# Patient Record
Sex: FEMALE | Race: OTHER | NOT HISPANIC OR LATINO
[De-identification: names, ages, dates, MRNs, and addresses within clinical notes are randomized per-mention and may not be internally consistent; named-entity substitution may affect disease eponyms.]

---

## 2019-09-06 ENCOUNTER — TRANSCRIPTION ENCOUNTER (OUTPATIENT)
Age: 84
End: 2019-09-06

## 2019-09-07 ENCOUNTER — INPATIENT (INPATIENT)
Facility: HOSPITAL | Age: 84
LOS: 3 days | Discharge: NURSING, REHAB OR LONG-TERM CARE | DRG: 481 | End: 2019-09-11
Attending: ORTHOPAEDIC SURGERY | Admitting: ORTHOPAEDIC SURGERY
Payer: MEDICARE

## 2019-09-07 ENCOUNTER — TRANSCRIPTION ENCOUNTER (OUTPATIENT)
Age: 84
End: 2019-09-07

## 2019-09-07 VITALS
TEMPERATURE: 98 F | HEIGHT: 64 IN | DIASTOLIC BLOOD PRESSURE: 88 MMHG | HEART RATE: 72 BPM | SYSTOLIC BLOOD PRESSURE: 135 MMHG | RESPIRATION RATE: 18 BRPM | WEIGHT: 130.07 LBS | OXYGEN SATURATION: 98 %

## 2019-09-07 DIAGNOSIS — S72.044A NONDISPLACED FRACTURE OF BASE OF NECK OF RIGHT FEMUR, INITIAL ENCOUNTER FOR CLOSED FRACTURE: ICD-10-CM

## 2019-09-07 DIAGNOSIS — Z95.2 PRESENCE OF PROSTHETIC HEART VALVE: Chronic | ICD-10-CM

## 2019-09-07 LAB
ANION GAP SERPL CALC-SCNC: 15 MMOL/L — SIGNIFICANT CHANGE UP (ref 5–17)
APPEARANCE UR: CLEAR — SIGNIFICANT CHANGE UP
APTT BLD: 33.3 SEC — SIGNIFICANT CHANGE UP (ref 27.5–36.3)
BILIRUB UR-MCNC: NEGATIVE — SIGNIFICANT CHANGE UP
BLD GP AB SCN SERPL QL: NEGATIVE — SIGNIFICANT CHANGE UP
BUN SERPL-MCNC: 20 MG/DL — SIGNIFICANT CHANGE UP (ref 7–23)
CALCIUM SERPL-MCNC: 9.1 MG/DL — SIGNIFICANT CHANGE UP (ref 8.4–10.5)
CHLORIDE SERPL-SCNC: 99 MMOL/L — SIGNIFICANT CHANGE UP (ref 96–108)
CO2 SERPL-SCNC: 27 MMOL/L — SIGNIFICANT CHANGE UP (ref 22–31)
COLOR SPEC: SIGNIFICANT CHANGE UP
CREAT SERPL-MCNC: 0.83 MG/DL — SIGNIFICANT CHANGE UP (ref 0.5–1.3)
DIFF PNL FLD: ABNORMAL
GLUCOSE SERPL-MCNC: 151 MG/DL — HIGH (ref 70–99)
GLUCOSE UR QL: NEGATIVE — SIGNIFICANT CHANGE UP
HCT VFR BLD CALC: 36.9 % — SIGNIFICANT CHANGE UP (ref 34.5–45)
HGB BLD-MCNC: 12 G/DL — SIGNIFICANT CHANGE UP (ref 11.5–15.5)
INR BLD: 1.2 RATIO — HIGH (ref 0.88–1.16)
KETONES UR-MCNC: NEGATIVE — SIGNIFICANT CHANGE UP
LEUKOCYTE ESTERASE UR-ACNC: ABNORMAL
MCHC RBC-ENTMCNC: 29.6 PG — SIGNIFICANT CHANGE UP (ref 27–34)
MCHC RBC-ENTMCNC: 32.4 GM/DL — SIGNIFICANT CHANGE UP (ref 32–36)
MCV RBC AUTO: 91.5 FL — SIGNIFICANT CHANGE UP (ref 80–100)
NITRITE UR-MCNC: NEGATIVE — SIGNIFICANT CHANGE UP
PH UR: 6.5 — SIGNIFICANT CHANGE UP (ref 5–8)
PLATELET # BLD AUTO: 273 K/UL — SIGNIFICANT CHANGE UP (ref 150–400)
POTASSIUM SERPL-MCNC: 3.7 MMOL/L — SIGNIFICANT CHANGE UP (ref 3.5–5.3)
POTASSIUM SERPL-SCNC: 3.7 MMOL/L — SIGNIFICANT CHANGE UP (ref 3.5–5.3)
PROT UR-MCNC: SIGNIFICANT CHANGE UP
PROTHROM AB SERPL-ACNC: 13.7 SEC — HIGH (ref 10–12.9)
RBC # BLD: 4.03 M/UL — SIGNIFICANT CHANGE UP (ref 3.8–5.2)
RBC # FLD: 12.4 % — SIGNIFICANT CHANGE UP (ref 10.3–14.5)
RH IG SCN BLD-IMP: POSITIVE — SIGNIFICANT CHANGE UP
SODIUM SERPL-SCNC: 141 MMOL/L — SIGNIFICANT CHANGE UP (ref 135–145)
SP GR SPEC: 1.02 — SIGNIFICANT CHANGE UP (ref 1.01–1.02)
UROBILINOGEN FLD QL: NEGATIVE — SIGNIFICANT CHANGE UP
WBC # BLD: 5.5 K/UL — SIGNIFICANT CHANGE UP (ref 3.8–10.5)
WBC # FLD AUTO: 5.5 K/UL — SIGNIFICANT CHANGE UP (ref 3.8–10.5)

## 2019-09-07 PROCEDURE — 72192 CT PELVIS W/O DYE: CPT | Mod: 26

## 2019-09-07 PROCEDURE — 73502 X-RAY EXAM HIP UNI 2-3 VIEWS: CPT | Mod: 26,RT

## 2019-09-07 PROCEDURE — 93010 ELECTROCARDIOGRAM REPORT: CPT

## 2019-09-07 PROCEDURE — 73552 X-RAY EXAM OF FEMUR 2/>: CPT | Mod: 26,RT

## 2019-09-07 PROCEDURE — 99285 EMERGENCY DEPT VISIT HI MDM: CPT

## 2019-09-07 PROCEDURE — 76377 3D RENDER W/INTRP POSTPROCES: CPT | Mod: 26

## 2019-09-07 PROCEDURE — 71045 X-RAY EXAM CHEST 1 VIEW: CPT | Mod: 26

## 2019-09-07 RX ORDER — POTASSIUM CHLORIDE 20 MEQ
1 PACKET (EA) ORAL
Qty: 0 | Refills: 0 | DISCHARGE

## 2019-09-07 RX ORDER — ENOXAPARIN SODIUM 100 MG/ML
40 INJECTION SUBCUTANEOUS DAILY
Refills: 0 | Status: DISCONTINUED | OUTPATIENT
Start: 2019-09-07 | End: 2019-09-07

## 2019-09-07 RX ORDER — DRONEDARONE 400 MG/1
400 TABLET, FILM COATED ORAL
Refills: 0 | Status: DISCONTINUED | OUTPATIENT
Start: 2019-09-07 | End: 2019-09-08

## 2019-09-07 RX ORDER — TRAMADOL HYDROCHLORIDE 50 MG/1
25 TABLET ORAL EVERY 4 HOURS
Refills: 0 | Status: DISCONTINUED | OUTPATIENT
Start: 2019-09-07 | End: 2019-09-08

## 2019-09-07 RX ORDER — VERAPAMIL HCL 240 MG
1 CAPSULE, EXTENDED RELEASE PELLETS 24 HR ORAL
Qty: 0 | Refills: 0 | DISCHARGE

## 2019-09-07 RX ORDER — FUROSEMIDE 40 MG
1 TABLET ORAL
Qty: 0 | Refills: 0 | DISCHARGE

## 2019-09-07 RX ORDER — ASPIRIN/CALCIUM CARB/MAGNESIUM 324 MG
1 TABLET ORAL
Qty: 0 | Refills: 0 | DISCHARGE

## 2019-09-07 RX ORDER — ACETAMINOPHEN 500 MG
650 TABLET ORAL EVERY 6 HOURS
Refills: 0 | Status: DISCONTINUED | OUTPATIENT
Start: 2019-09-07 | End: 2019-09-08

## 2019-09-07 RX ORDER — VERAPAMIL HCL 240 MG
120 CAPSULE, EXTENDED RELEASE PELLETS 24 HR ORAL
Refills: 0 | Status: DISCONTINUED | OUTPATIENT
Start: 2019-09-07 | End: 2019-09-08

## 2019-09-07 RX ORDER — ATORVASTATIN CALCIUM 80 MG/1
1 TABLET, FILM COATED ORAL
Qty: 0 | Refills: 0 | DISCHARGE

## 2019-09-07 RX ORDER — SODIUM CHLORIDE 9 MG/ML
1000 INJECTION, SOLUTION INTRAVENOUS
Refills: 0 | Status: DISCONTINUED | OUTPATIENT
Start: 2019-09-07 | End: 2019-09-08

## 2019-09-07 RX ORDER — FUROSEMIDE 40 MG
20 TABLET ORAL DAILY
Refills: 0 | Status: DISCONTINUED | OUTPATIENT
Start: 2019-09-07 | End: 2019-09-08

## 2019-09-07 RX ORDER — TRAMADOL HYDROCHLORIDE 50 MG/1
50 TABLET ORAL EVERY 4 HOURS
Refills: 0 | Status: DISCONTINUED | OUTPATIENT
Start: 2019-09-07 | End: 2019-09-08

## 2019-09-07 RX ORDER — DRONEDARONE 400 MG/1
1 TABLET, FILM COATED ORAL
Qty: 0 | Refills: 0 | DISCHARGE

## 2019-09-07 RX ORDER — ATORVASTATIN CALCIUM 80 MG/1
20 TABLET, FILM COATED ORAL AT BEDTIME
Refills: 0 | Status: DISCONTINUED | OUTPATIENT
Start: 2019-09-07 | End: 2019-09-08

## 2019-09-07 RX ORDER — ASPIRIN/CALCIUM CARB/MAGNESIUM 324 MG
81 TABLET ORAL DAILY
Refills: 0 | Status: DISCONTINUED | OUTPATIENT
Start: 2019-09-07 | End: 2019-09-08

## 2019-09-07 RX ADMIN — Medication 120 MILLIGRAM(S): at 23:43

## 2019-09-07 RX ADMIN — DRONEDARONE 400 MILLIGRAM(S): 400 TABLET, FILM COATED ORAL at 23:42

## 2019-09-07 NOTE — ED CLERICAL - NS ED CLERK NOTE PRE-ARRIVAL INFORMATION; ADDITIONAL PRE-ARRIVAL INFORMATION
CC/Reason For referral: impacted femoral neck fracture  Preferred Consultant(if applicable):   Who admits for you (if needed): Hospitalist  Do you have documents you would like to fax over? yes/ information already faxed to ED  Would you still like to speak to an ED attending? yes

## 2019-09-07 NOTE — H&P ADULT - NSHPPHYSICALEXAM_GEN_ALL_CORE
T(C): 36.8 (09-07-19 @ 14:14)  HR: 83 (09-07-19 @ 14:14)  BP: 123/71 (09-07-19 @ 14:14)  RR: 16 (09-07-19 @ 14:14)  SpO2: 98% (09-07-19 @ 14:14)  Wt(kg): --    PE RLE:  Skin intact; Compartments soft. No ecchymosis/soft tissue swelling.  + TTP around R Hip. No TTP to knee/leg/ankle/foot. ROM limited 2/2 pain. Able to SLR. + Log Roll/Heel Strike. + DP/PT pulses 2+/4. SILT L2-S1. + TA/EHL/FHL/GS.    Secondary Survey: No TTP over bony prominences, SILT, palpable pulses, full/painless range of motion, compartments soft

## 2019-09-07 NOTE — ED PROVIDER NOTE - ATTENDING CONTRIBUTION TO CARE
Attending MD Jaimes: I personally have seen and examined this patient.  Resident note reviewed and agree on plan of care and except where noted.  See below for details.     Seen in FT4, unaccompanied  PMD Dr. America Zuniga ([place])    87F with PMH/PSH including aortic valve surgery 8 yrs ago on Multaq, Verapamil, Furosemide, Atorvastatin presents to the ED s/p fall getting out of car 9/6/19.  Reports after that got up unassisted, gathered her things and went back to her home.  Reports daughter encouraged her to get XRay.  Reports she went to urgent care yesterday in [place] and had XRay.  Today, received a call that something was broken.  Reports told to take Tylenol for pain about 930am.  Reports allergy to amoxicillin (rash).  Denies chest pain, shortness of breath, palpitations. Denies headache. Denies change in vision, double vision, sudden loss of vision. Denies abdominal pain, nausea, vomiting, diarrhea, blood in stools. Denies dysuria, hematuria, change in urinary habits including frequency, urgency. Denies loss of urinary or bowel continence. Reports she does not want to be here.  On exam, NAD, head NCAT, PERRL, FROM at neck, no tenderness to midline palpation, no stepoffs along length of spine, lungs CTAB with good inspiratory effort, +S1S2, no m/r/g, abdomen soft with +BS, NT, ND, no CVAT, +tenderness to palpation at R hip, no overlying skin changes, moving all extremities including RLE, +2 DPs; A/P: 87F with R hip fx on outpatient XR, will review images    TO BE COMPLETED Attending MD Jaimes: I personally have seen and examined this patient.  Resident note reviewed and agree on plan of care and except where noted.  See below for details.     Seen in FT4, unaccompanied  PMD Dr. America Zuniga ([place])    87F with PMH/PSH including aortic valve surgery 8 yrs ago on Multaq, Verapamil, Furosemide, Atorvastatin presents to the ED s/p fall getting out of car 9/6/19.  Reports after that got up unassisted, gathered her things and went back to her home.  Reports daughter encouraged her to get XRay.  Reports she went to urgent care yesterday in [place] and had XRay.  Today, received a call that something was broken.  Reports told to take Tylenol for pain about 930am.  Reports allergy to amoxicillin (rash).  Denies chest pain, shortness of breath, palpitations. Denies headache. Denies change in vision, double vision, sudden loss of vision. Denies abdominal pain, nausea, vomiting, diarrhea, blood in stools. Denies dysuria, hematuria, change in urinary habits including frequency, urgency. Denies loss of urinary or bowel continence. Reports she does not want to be here.  oOn exam, NAD, head NCAT, PERRL, FROM at neck, no tenderness to midline palpation, no stepoffs along length of spine, lungs CTAB with good inspiratory effort, +S1S2, no m/r/g, abdomen soft with +BS, NT, ND, no CVAT, +tenderness to palpation at R hip, no overlying skin changes, moving all extremities including RLE, +2 DPs; A/P: 87F with R hip fx on outpatient XR, will review images    TO BE COMPLETED    Patient asked to change in to a gown.  Refused.  Reports that she has already been examined.  Explained that the request to change in to a gown Attending MD Jaimes: I personally have seen and examined this patient.  Resident note reviewed and agree on plan of care and except where noted.  See below for details.     Seen in FT4, unaccompanied  PMD Dr. America Zuniga ([place])    87F with PMH/PSH including aortic valve surgery 8 yrs ago on Multaq, Verapamil, Furosemide, Atorvastatin presents to the ED s/p fall getting out of car 9/6/19.  Reports after that got up unassisted, gathered her things and went back to her home.  Reports daughter encouraged her to get XRay.  Reports she went to urgent care yesterday in [place] and had XRay.  Today, received a call that something was broken.  Reports told to take Tylenol for pain about 930am.  Reports allergy to amoxicillin (rash).  Denies chest pain, shortness of breath, palpitations. Denies headache. Denies change in vision, double vision, sudden loss of vision. Denies abdominal pain, nausea, vomiting, diarrhea, blood in stools. Denies dysuria, hematuria, change in urinary habits including frequency, urgency. Denies loss of urinary or bowel continence. Reports she does not want to be here.  On exam, NAD, head NCAT, PERRL, FROM at neck, no tenderness to midline palpation, no stepoffs along length of spine, lungs CTAB with good inspiratory effort, +S1S2, no m/r/g, abdomen soft with +BS, NT, ND, no CVAT, +tenderness to palpation at R hip, no overlying skin changes, moving all extremities including RLE, +2 DPs; A/P: 87F with R hip fx on outpatient XR, will review images if available, will call rads, otherwise will repeat XR, pain control, +/- ortho, asked patient to stay in her bed since she has a presumed fracture of the hip.  Patient asked to change in to a gown.  Refused.  Reports that she has already been examined.  Explained that the request to change in to a gown.  Patient began yelling at this MD.  Tried to explain this was in order to provide the best patient care as I cannot visualize the hip with her pants on.  Patient reported I should be able to do my job with her dressed.  Patient eventually agreed to remove pants.

## 2019-09-07 NOTE — CONSULT NOTE ADULT - SUBJECTIVE AND OBJECTIVE BOX
The patient was seen and examined tonight after an accidental fall 09/05/19 with an acute impacted right femoral neck fracture  that requires orthopedic ORIF. Her comorbidities include non metal AVR 2011, arrythmia controlled with multaq and verapamil; osteoporosis,  controlled hypercholesteremia and advanced age. Exam, lab, EKG and CXR are stable. The patient is medically stable, medically optimized and has no medical contraindication to surgery tomorrow as required. Exam time 70 minutes including > than 50 % for bedside discussion and counseling. Comprehensive consultation dictated #[id number] The patient was seen and examined tonight after an accidental fall 19 with an acute impacted right femoral neck fracture  that requires orthopedic ORIF. Her comorbidities include non metal AVR 2011, arrythmia controlled with multaq and verapamil; osteoporosis,  controlled hypercholesteremia and advanced age. Exam, lab, EKG and CXR are stable. The patient is medically stable, medically optimized and has no medical contraindication to surgery tomorrow as required. Exam time 70 minutes including > than 50 % for bedside discussion and counseling. Comprehensive consultation dictated #[id number]      HISTORY OF PRESENT ILLNESS:  This is the first [facility] admission for this 87-year-old female.  She was in her garage in her new apartment building and slipped on the pavement and was able to get up two days ago.  Shortly thereafter, she developed localized pain in her right hip which persisted.  She went to a walk-in urgent care center and was told that her leg was normal because she can still walk.  She persisted and came to the emergency room for evaluation because of right hip pain and x-rays were unremarkable, but CT scan shows a right subcapital femoral neck fracture which is impacted which is what allows her to walk.  The area is not displaced because it is impacted, but it is unstable and surgical stabilization has been advised.  The patient has moderate pain and otherwise has no other regions of trauma or new medical complaints.    MEDICATIONS:  Her medications that she presently takes, one baby aspirin daily, 400 mg Multaq twice a day, 300 mg of verapamil once a day, 20 mg of Lasix daily, 10 mg of potassium daily, 20 mg of atorvastatin daily.    ALLERGIES:  SHE IS ALLERGIC TO PENICILLIN WITH RASH.    SOCIAL HISTORY:  She is a nonsmoker, nondrinker with no drug history.  She is a retired  and presently lives alone.    PAST SURGICAL HISTORY:  Her past surgical history includes vaginal delivery x3; in , she had an aortic valve replacement.    PAST MEDICAL HISTORY:  Her past medical history includes cardiac arrhythmia, peripheral edema, hypercholesterolemia, osteoporosis.    FAMILY HISTORY:  Her family history is significant for mother and father with arteriosclerotic heart disease and  at age 62 and 78.  She has a daughter with breast cancer.    DIET:  The patient's diet is regular with a weight of 130 pounds.    REVIEW OF SYSTEMS:  Her review of systems is essentially normal.  She has no headaches or dizziness.  No changes in hearing or vision or sinusitis.  No dental disease.  No difficulty swallowing.  She has no shortness of breath, cough, congestion or wheezing at rest or with exertion.  She has no chest pain, palpitations or arrhythmias at the present time or present medications.  She has no abdominal pain, change in bowel habits or GI bleeding.  She has occasional GERD.  She has no recent urinary tract infections, but she does state that she has new onset of stress incontinence after sneezing.  She has degenerative joint disease of the knees which affects her ability to walk, but had nothing to do with her fracture and fall.  She has no neurological complaints.    PHYSICAL EXAMINATION:  VITAL SIGNS:  At this time shows a blood pressure of 127/68, pulse of 86, respirations are 18.  She is afebrile.  HEENT:  Head and neck examination is normal.  3+ carotids with no bruits.  There is no thyromegaly.  LUNGS:  Chest is clear with good breath sounds bilaterally.  HEART:  With increased S2 and opening snap, no murmurs.  ABDOMEN:  Without masses, tenderness, guarding or rebound.  EXTREMITIES:  She has 3+ right proximal femur edema which is well localized and there is no deviation laterally or displacement of the right leg.  NEUROLOGICAL:  Examination is normal with normal sensation in all toes and good sense of touch and position.  Strength is 5/5.      LABORATORY DATA:  Laboratories obtained shows a CBC with hemoglobin of 12.0, hematocrit 36.9, white count of 5.5, platelet count is 273,000.  INR is 1.2.  PTT is 33.3.  Sodium 141, potassium 3.7, chloride 99, CO2 is 27, BUN is 20, creatinine is 0.83, blood sugar is 151.  Today, she has not been told being diabetic, GFR 63 mL per minute.  Urinalysis with moderate wbc's.    DIAGNOSTIC DATA:  CT with impacted subcapital right femoral neck fracture that is visible, also seen on plain x-ray.  Chest x-ray is clear.  EKG shows anterior wall MI age indeterminate, normal ST-T waves, normal sinus rhythm with heart rate of 90.    ASSESSMENT AND PLAN:  The impression at this time is that the patient has an accidental fall with likely osteoporosis and impacted right femoral neck fracture which requires surgical stabilization.  The patient can weight bear [place] with no limitations.  She is status post aortic valve replacement, but no anticoagulation therapy indicating that this is a porcine valve.  She is on a verapamil and Multaq indicative of paroxysmal supraventricular tachycardia that has now been converted to normal sinus rhythm.  The patient gives no history of ventricular arrhythmia.  She has got osteoporosis which may limit her healing and certainly has much to do with the fact that she has a fracture after falling.  She has a moderate evidence for urinary tract infection and will be covered with pre and postoperative antibiotics.  Urinary culture is pending.  The patient is medically stable and has no medical contraindications to surgery as is required.  Examination time was 70 minutes of which greater than 50% was necessary for bedside discussion and counseling.  The patient will continue to have medical followup care postoperatively.      _______________________    DICT:	MADISYN FAIR MD ([id number]) 2019 12:52 AM  TRANS:	[id number] 2019 12:57 AM  JOB:	[id number]     Electronically Signed by:  MADISYN FAIR 2019 11:03:08 AM

## 2019-09-07 NOTE — ED ADULT NURSE NOTE - NS ED NURSE REPORT GIVEN TO FT
Report given to on coming nurse [facility] [address]. Understands pmh, medications given and plan of care for patient. Patient in stable condition, vital signs updated, has no complaints at this time and has been updated on care plan. Explained to patient that it is change of shift and new nurse is taking over, pt verbalized understanding.

## 2019-09-07 NOTE — ED ADULT TRIAGE NOTE - CCCP TRG CHIEF CMPLNT
R hip pain s/p trip and fall 2days ago sent by [facility] urgent care for R hip fracture seen by XRay

## 2019-09-07 NOTE — ED PROVIDER NOTE - PROGRESS NOTE DETAILS
Attending MD Jaimes: Called rads, no images on PACS, will obtain XR, explained to patient and daughter, Sonia.  Unable to open CD with images. Attending MD Jaimes: Spoke with ortho resident TJ about XR showing ?fem neck fx, recommended CT Pelvis, requested he be contacted after CT performed.  Will await. Attending MD Jaimes: Spoke with ortho resident TJ about XR showing ?fem neck fx, recommended CT Pelvis, requested he be contacted after CT performed.  Will await.  Patient, daughter and son in law updated, amenable. Attending MD Jaimes: Patient found out of bed.  Asked to go back into bed.  Stated "I don't like you, I don't want you in here".  Explained that she had previously been asked to stay in bed because she could worsen the injury that she has and could need a bigger surgery.  "I don't want surgery, I am not getting surgery."  I told patient that would be her decision but in the meantime, to please get in bed.  Lowered chair in FT 4, and offered patient help.  Stated "Don't touch me, I don't want you in here, you bitch". Told patient that I was not touching her but that I would stand near her in the event that she fell.  She said "I hope I fall and die".  I asked patient if she had any thoughts of hurting herself.  She reported "I have never had a mental health issue in my life, I taught for years.  My daughter is very sick and that's why I want to die."  Asked her if she wanted to speak to someone.  Declined.  Patient transferred to bed on her own without incident. Attending MD Jaimes: Ortho bedside Attending MD Jaimes: Patient found out of bed.  Asked to go back into bed.  Stated "I don't like you, I don't want you in here".  Explained that she had previously been asked to stay in bed because she could worsen the injury that she has and could need a bigger surgery.  "I don't want surgery, I am not getting surgery."  I told patient that would be her decision but in the meantime, to please get in bed.  Lowered chair in FT 4, and offered patient help.  Stated "Don't touch me, I don't want you in here, you bitch". Told patient that I was not touching her but that I would stand near her in the event that she fell.  She said "I hope I fall and die".  I asked patient if she had any thoughts of hurting herself.  She reported "I have never had a mental health issue in my life, I taught for years.  My daughter is very sick and that's why I want to die."  Asked her if she wanted to speak to someone.  Declined.  Patient transferred to bed on her own without incident.  Spoke with daughter, Sonia, who reports patient has been saying that she wants to die for years.  Denies suicidal attempts. Brant PARTIDA: Patient signed out pending admission to ortho after ekg, labs done. Patient to be admitted to Dr. Goldberg. Brant PARTIDA: Patient signed out pending admission to ortho after ekg, labs done. Patient to be admitted to Dr. Gerardo. Attending MD Jaimse: Ortho bedside    Attending MD Jaimes: Family updated, apologetic for patient's behavior, both Sonia and son in law report this is her baseline

## 2019-09-07 NOTE — ED PROVIDER NOTE - CCCP TRG CHIEF CMPLNT
R hip pain s/p trip and fall 2days ago sent by [facility] urgent care for R hip fracture seen by XRay

## 2019-09-07 NOTE — ED PROVIDER NOTE - PRO INTERPRETER NEED 2
English Acne Type: Comedonal Lesions Post-Care Instructions: I reviewed with the patient in detail post-care instructions. Patient is to keep the treatment areas dry overnight, and then apply bacitracin twice daily until healed. Patient may apply hydrogen peroxide soaks to remove any crusting. Render Post-Care Instructions In Note?: no Detail Level: Detailed Prep Text (Optional): Prior to removal the treatment areas were prepped in the usual fashion. Extraction Method: 11 blade and comedo extractor Consent was obtained and risks were reviewed including but not limited to scarring, infection, bleeding, scabbing, incomplete removal, and allergy to anesthesia.

## 2019-09-07 NOTE — H&P ADULT - HISTORY OF PRESENT ILLNESS
87y Female presents to ED s/p [facility] fall 2 days ago after being called by urgent care that her hip xrays demonstrated fractured hip - c/o severe R Hip pain when ambulating but has been walking last two days.  Patient denies head hit or LOC.  denies recent fall since then. Localizes pain to R hip/femur. Patient denies radiation of pain. Patient denies numbness/tingling/burning in the RLE. Patient denies any other injuries. Patient is a community ambulator without assistive devices. Patient has no issues w/ ADLs/IADLs. Had aortic valve replacement 8 years ago and recently moved from [place].

## 2019-09-07 NOTE — ED ADULT NURSE NOTE - OBJECTIVE STATEMENT
88 y/o female presents to ed c/o s.p fall two days ago. Landed on right hip and hand. Denies further trauma, chest pain, sob, ha, n/v/d, abdominal pain, f/c, urinary symptoms, hematuria. Was evaluated at urgent care yesterday and got a call back for abnormal x ray. Took 500mg of Tylenol. A&Ox4, vss, skin warm dry and intact, MAEx4, lungs CTA, abd soft nondistended. Patient's bed in the lowest position, explained plan of care to patient and family members. Will continue to reassess.

## 2019-09-07 NOTE — ED ADULT NURSE NOTE - CHIEF COMPLAINT QUOTE
R hip pain s/p trip and fall 2days ago sent by [facility] urgent care for R hip fracture seen by XRay

## 2019-09-07 NOTE — ED ADULT TRIAGE NOTE - CHIEF COMPLAINT QUOTE
R hip pain s/p trip and fall 2days ago sent by [facility] urgent care for R hip fracture seen by XRay

## 2019-09-07 NOTE — ED ADULT NURSE NOTE - CCCP TRG CHIEF CMPLNT
R hip pain s/p trip and fall 2days ago sent by [facility] urgent care for R hip fracture seen by XRay

## 2019-09-07 NOTE — H&P ADULT - NSHPLABSRESULTS_GEN_ALL_CORE
Imaging demonstrating Impacted right femoral neck fracture                            12.0   5.5   )-----------( 273      ( 07 Sep 2019 17:32 )             36.9       09-07    141  |  99  |  20  ----------------------------<  151<H>  3.7   |  27  |  0.83    Ca    9.1      07 Sep 2019 17:32                    PT/INR - ( 07 Sep 2019 17:32 )   PT: 13.7 sec;   INR: 1.20 ratio         PTT - ( 07 Sep 2019 17:32 )  PTT:33.3 sec    Lactate Trend            CAPILLARY BLOOD GLUCOSE

## 2019-09-08 LAB
ANION GAP SERPL CALC-SCNC: 12 MMOL/L — SIGNIFICANT CHANGE UP (ref 5–17)
ANION GAP SERPL CALC-SCNC: 13 MMOL/L — SIGNIFICANT CHANGE UP (ref 5–17)
APTT BLD: 30.7 SEC — SIGNIFICANT CHANGE UP (ref 27.5–36.3)
APTT BLD: 33.5 SEC — SIGNIFICANT CHANGE UP (ref 27.5–36.3)
BLD GP AB SCN SERPL QL: NEGATIVE — SIGNIFICANT CHANGE UP
BUN SERPL-MCNC: 15 MG/DL — SIGNIFICANT CHANGE UP (ref 7–23)
BUN SERPL-MCNC: 18 MG/DL — SIGNIFICANT CHANGE UP (ref 7–23)
CALCIUM SERPL-MCNC: 8.8 MG/DL — SIGNIFICANT CHANGE UP (ref 8.4–10.5)
CALCIUM SERPL-MCNC: 8.9 MG/DL — SIGNIFICANT CHANGE UP (ref 8.4–10.5)
CHLORIDE SERPL-SCNC: 102 MMOL/L — SIGNIFICANT CHANGE UP (ref 96–108)
CHLORIDE SERPL-SCNC: 102 MMOL/L — SIGNIFICANT CHANGE UP (ref 96–108)
CO2 SERPL-SCNC: 24 MMOL/L — SIGNIFICANT CHANGE UP (ref 22–31)
CO2 SERPL-SCNC: 26 MMOL/L — SIGNIFICANT CHANGE UP (ref 22–31)
CREAT SERPL-MCNC: 0.68 MG/DL — SIGNIFICANT CHANGE UP (ref 0.5–1.3)
CREAT SERPL-MCNC: 0.69 MG/DL — SIGNIFICANT CHANGE UP (ref 0.5–1.3)
GLUCOSE SERPL-MCNC: 94 MG/DL — SIGNIFICANT CHANGE UP (ref 70–99)
GLUCOSE SERPL-MCNC: 94 MG/DL — SIGNIFICANT CHANGE UP (ref 70–99)
HCT VFR BLD CALC: 32.1 % — LOW (ref 34.5–45)
HCT VFR BLD CALC: 32.5 % — LOW (ref 34.5–45)
HGB BLD-MCNC: 11 G/DL — LOW (ref 11.5–15.5)
HGB BLD-MCNC: 11.3 G/DL — LOW (ref 11.5–15.5)
INR BLD: 1.14 RATIO — SIGNIFICANT CHANGE UP (ref 0.88–1.16)
INR BLD: 1.17 RATIO — HIGH (ref 0.88–1.16)
MCHC RBC-ENTMCNC: 30.7 PG — SIGNIFICANT CHANGE UP (ref 27–34)
MCHC RBC-ENTMCNC: 30.9 PG — SIGNIFICANT CHANGE UP (ref 27–34)
MCHC RBC-ENTMCNC: 34.3 GM/DL — SIGNIFICANT CHANGE UP (ref 32–36)
MCHC RBC-ENTMCNC: 34.6 GM/DL — SIGNIFICANT CHANGE UP (ref 32–36)
MCV RBC AUTO: 89.4 FL — SIGNIFICANT CHANGE UP (ref 80–100)
MCV RBC AUTO: 89.5 FL — SIGNIFICANT CHANGE UP (ref 80–100)
PLATELET # BLD AUTO: 245 K/UL — SIGNIFICANT CHANGE UP (ref 150–400)
PLATELET # BLD AUTO: 250 K/UL — SIGNIFICANT CHANGE UP (ref 150–400)
POTASSIUM SERPL-MCNC: 3.9 MMOL/L — SIGNIFICANT CHANGE UP (ref 3.5–5.3)
POTASSIUM SERPL-MCNC: 4.3 MMOL/L — SIGNIFICANT CHANGE UP (ref 3.5–5.3)
POTASSIUM SERPL-SCNC: 3.9 MMOL/L — SIGNIFICANT CHANGE UP (ref 3.5–5.3)
POTASSIUM SERPL-SCNC: 4.3 MMOL/L — SIGNIFICANT CHANGE UP (ref 3.5–5.3)
PROTHROM AB SERPL-ACNC: 13 SEC — HIGH (ref 10–12.9)
PROTHROM AB SERPL-ACNC: 13.5 SEC — HIGH (ref 10–12.9)
RBC # BLD: 3.58 M/UL — LOW (ref 3.8–5.2)
RBC # BLD: 3.64 M/UL — LOW (ref 3.8–5.2)
RBC # FLD: 12 % — SIGNIFICANT CHANGE UP (ref 10.3–14.5)
RBC # FLD: 12.2 % — SIGNIFICANT CHANGE UP (ref 10.3–14.5)
RH IG SCN BLD-IMP: POSITIVE — SIGNIFICANT CHANGE UP
SODIUM SERPL-SCNC: 138 MMOL/L — SIGNIFICANT CHANGE UP (ref 135–145)
SODIUM SERPL-SCNC: 141 MMOL/L — SIGNIFICANT CHANGE UP (ref 135–145)
WBC # BLD: 4.8 K/UL — SIGNIFICANT CHANGE UP (ref 3.8–10.5)
WBC # BLD: 5.1 K/UL — SIGNIFICANT CHANGE UP (ref 3.8–10.5)
WBC # FLD AUTO: 4.8 K/UL — SIGNIFICANT CHANGE UP (ref 3.8–10.5)
WBC # FLD AUTO: 5.1 K/UL — SIGNIFICANT CHANGE UP (ref 3.8–10.5)

## 2019-09-08 PROCEDURE — 27235 TREAT THIGH FRACTURE: CPT | Mod: RT

## 2019-09-08 RX ORDER — ONDANSETRON 8 MG/1
4 TABLET, FILM COATED ORAL EVERY 6 HOURS
Refills: 0 | Status: DISCONTINUED | OUTPATIENT
Start: 2019-09-08 | End: 2019-09-09

## 2019-09-08 RX ORDER — VERAPAMIL HCL 240 MG
120 CAPSULE, EXTENDED RELEASE PELLETS 24 HR ORAL
Refills: 0 | Status: DISCONTINUED | OUTPATIENT
Start: 2019-09-08 | End: 2019-09-11

## 2019-09-08 RX ORDER — ACETAMINOPHEN 500 MG
1000 TABLET ORAL ONCE
Refills: 0 | Status: COMPLETED | OUTPATIENT
Start: 2019-09-08 | End: 2019-09-08

## 2019-09-08 RX ORDER — FOLIC ACID 0.8 MG
1 TABLET ORAL DAILY
Refills: 0 | Status: DISCONTINUED | OUTPATIENT
Start: 2019-09-08 | End: 2019-09-11

## 2019-09-08 RX ORDER — OXYCODONE HYDROCHLORIDE 5 MG/1
5 TABLET ORAL EVERY 4 HOURS
Refills: 0 | Status: DISCONTINUED | OUTPATIENT
Start: 2019-09-08 | End: 2019-09-09

## 2019-09-08 RX ORDER — ASCORBIC ACID 60 MG
500 TABLET,CHEWABLE ORAL
Refills: 0 | Status: DISCONTINUED | OUTPATIENT
Start: 2019-09-08 | End: 2019-09-11

## 2019-09-08 RX ORDER — DIPHENHYDRAMINE HCL 50 MG
25 CAPSULE ORAL AT BEDTIME
Refills: 0 | Status: DISCONTINUED | OUTPATIENT
Start: 2019-09-08 | End: 2019-09-11

## 2019-09-08 RX ORDER — BENZOCAINE AND MENTHOL 5; 1 G/100ML; G/100ML
1 LIQUID ORAL EVERY 6 HOURS
Refills: 0 | Status: DISCONTINUED | OUTPATIENT
Start: 2019-09-08 | End: 2019-09-11

## 2019-09-08 RX ORDER — OXYCODONE HYDROCHLORIDE 5 MG/1
10 TABLET ORAL EVERY 4 HOURS
Refills: 0 | Status: DISCONTINUED | OUTPATIENT
Start: 2019-09-08 | End: 2019-09-09

## 2019-09-08 RX ORDER — ATORVASTATIN CALCIUM 80 MG/1
20 TABLET, FILM COATED ORAL AT BEDTIME
Refills: 0 | Status: DISCONTINUED | OUTPATIENT
Start: 2019-09-08 | End: 2019-09-11

## 2019-09-08 RX ORDER — FERROUS SULFATE 325(65) MG
325 TABLET ORAL
Refills: 0 | Status: DISCONTINUED | OUTPATIENT
Start: 2019-09-08 | End: 2019-09-11

## 2019-09-08 RX ORDER — ENOXAPARIN SODIUM 100 MG/ML
40 INJECTION SUBCUTANEOUS EVERY 24 HOURS
Refills: 0 | Status: DISCONTINUED | OUTPATIENT
Start: 2019-09-08 | End: 2019-09-11

## 2019-09-08 RX ORDER — ASPIRIN/CALCIUM CARB/MAGNESIUM 324 MG
81 TABLET ORAL DAILY
Refills: 0 | Status: DISCONTINUED | OUTPATIENT
Start: 2019-09-08 | End: 2019-09-11

## 2019-09-08 RX ORDER — DOCUSATE SODIUM 100 MG
100 CAPSULE ORAL THREE TIMES A DAY
Refills: 0 | Status: DISCONTINUED | OUTPATIENT
Start: 2019-09-08 | End: 2019-09-11

## 2019-09-08 RX ORDER — CHLORHEXIDINE GLUCONATE 213 G/1000ML
1 SOLUTION TOPICAL DAILY
Refills: 0 | Status: DISCONTINUED | OUTPATIENT
Start: 2019-09-08 | End: 2019-09-08

## 2019-09-08 RX ORDER — ACETAMINOPHEN 500 MG
650 TABLET ORAL EVERY 6 HOURS
Refills: 0 | Status: DISCONTINUED | OUTPATIENT
Start: 2019-09-08 | End: 2019-09-09

## 2019-09-08 RX ORDER — HYDROMORPHONE HYDROCHLORIDE 2 MG/ML
0.25 INJECTION INTRAMUSCULAR; INTRAVENOUS; SUBCUTANEOUS
Refills: 0 | Status: DISCONTINUED | OUTPATIENT
Start: 2019-09-08 | End: 2019-09-08

## 2019-09-08 RX ORDER — HYDROMORPHONE HYDROCHLORIDE 2 MG/ML
0.5 INJECTION INTRAMUSCULAR; INTRAVENOUS; SUBCUTANEOUS
Refills: 0 | Status: DISCONTINUED | OUTPATIENT
Start: 2019-09-08 | End: 2019-09-09

## 2019-09-08 RX ORDER — SODIUM CHLORIDE 9 MG/ML
1000 INJECTION, SOLUTION INTRAVENOUS
Refills: 0 | Status: DISCONTINUED | OUTPATIENT
Start: 2019-09-08 | End: 2019-09-11

## 2019-09-08 RX ADMIN — DRONEDARONE 400 MILLIGRAM(S): 400 TABLET, FILM COATED ORAL at 09:54

## 2019-09-08 RX ADMIN — Medication 500 MILLIGRAM(S): at 21:34

## 2019-09-08 RX ADMIN — Medication 650 MILLIGRAM(S): at 01:36

## 2019-09-08 RX ADMIN — Medication 650 MILLIGRAM(S): at 02:00

## 2019-09-08 RX ADMIN — Medication 325 MILLIGRAM(S): at 21:34

## 2019-09-08 RX ADMIN — Medication 120 MILLIGRAM(S): at 09:54

## 2019-09-08 RX ADMIN — SODIUM CHLORIDE 75 MILLILITER(S): 9 INJECTION, SOLUTION INTRAVENOUS at 11:14

## 2019-09-08 RX ADMIN — SODIUM CHLORIDE 75 MILLILITER(S): 9 INJECTION, SOLUTION INTRAVENOUS at 17:32

## 2019-09-08 RX ADMIN — HYDROMORPHONE HYDROCHLORIDE 0.25 MILLIGRAM(S): 2 INJECTION INTRAMUSCULAR; INTRAVENOUS; SUBCUTANEOUS at 18:30

## 2019-09-08 RX ADMIN — CHLORHEXIDINE GLUCONATE 1 APPLICATION(S): 213 SOLUTION TOPICAL at 09:53

## 2019-09-08 RX ADMIN — ATORVASTATIN CALCIUM 20 MILLIGRAM(S): 80 TABLET, FILM COATED ORAL at 09:55

## 2019-09-08 RX ADMIN — Medication 1000 MILLIGRAM(S): at 18:15

## 2019-09-08 RX ADMIN — HYDROMORPHONE HYDROCHLORIDE 0.25 MILLIGRAM(S): 2 INJECTION INTRAMUSCULAR; INTRAVENOUS; SUBCUTANEOUS at 18:15

## 2019-09-08 RX ADMIN — ATORVASTATIN CALCIUM 20 MILLIGRAM(S): 80 TABLET, FILM COATED ORAL at 21:35

## 2019-09-08 RX ADMIN — Medication 400 MILLIGRAM(S): at 17:45

## 2019-09-08 NOTE — PRE-ANESTHESIA EVALUATION ADULT - NSANTHOSAYNRD_GEN_A_CORE
No. ZEUS screening performed.  STOP BANG Legend: 0-2 = LOW Risk; 3-4 = INTERMEDIATE Risk; 5-8 = HIGH Risk

## 2019-09-08 NOTE — BRIEF OPERATIVE NOTE - NSICDXBRIEFPROCEDURE_GEN_ALL_CORE_FT
PROCEDURES:  Closed reduction and percutaneous pinning of right hip 08-Sep-2019 16:50:53  Griffin Godfrey

## 2019-09-08 NOTE — PROGRESS NOTE ADULT - SUBJECTIVE AND OBJECTIVE BOX
ORTHO POST OP CHECK    S: Pt seen and examined. Doing well postoperatively. Pain well controlled. Denies N/V/CP/SOB.       O:   PE:  Gen: NAD, laying comfortably in bed  Resp: Unlabored breathing  MSK: Dressing c/d/i          +EHL/FHL/TA/Gas/SOl          +DP/SP/Sally/Saph           2+DP                          11.3   4.8   )-----------( 245      ( 08 Sep 2019 17:30 )             32.5     09-08    138  |  102  |  15  ----------------------------<  94  4.3   |  24  |  0.69    Ca    8.8      08 Sep 2019 17:30        Vital Signs Last 24 Hrs  T(C): 36.6 (08 Sep 2019 20:00), Max: 37.2 (08 Sep 2019 07:48)  T(F): 97.9 (08 Sep 2019 20:00), Max: 99 (08 Sep 2019 07:48)  HR: 88 (08 Sep 2019 20:00) (76 - 89)  BP: 97/58 (08 Sep 2019 20:00) (97/58 - 134/74)  BP(mean): --  RR: 18 (08 Sep 2019 20:00) (16 - 20)  SpO2: 94% (08 Sep 2019 20:00) (94% - 100%)  I&O's Summary    07 Sep 2019 07:01  -  08 Sep 2019 07:00  --------------------------------------------------------  IN: 900 mL / OUT: 575 mL / NET: 325 mL    08 Sep 2019 07:01  -  08 Sep 2019 21:09  --------------------------------------------------------  IN: 775 mL / OUT: 1250 mL / NET: -475 mL        A/P: 87yoF s/p CRPP of R FN fx 9/8    -Neuro: Multimodal pain control  -Resp: IS  -GI: reg diet  -MSK: OOB, WBAT, PT/OT  -Heme: DVT PPx: Lov, A81    Ortho

## 2019-09-08 NOTE — PROGRESS NOTE ADULT - SUBJECTIVE AND OBJECTIVE BOX
ORTHO ATTENDING POST OP    S/P CRPP R hip  WBAT  R  LE  lovenox 40 QD  venodynes  ancef 1 g x 24 h  OOB to chair in AM  rehab consult   f/u medicine  CBC in RR and AM

## 2019-09-08 NOTE — PROGRESS NOTE ADULT - SUBJECTIVE AND OBJECTIVE BOX
Patient is a 87y old  Female who presents with a chief complaint of acute impacted right femoral neck hip fracture   Patient for operative fixation today  Patient anxious       T(C): 36.4 (09-08-19 @ 04:25), Max: 36.9 (09-07-19 @ 20:24)  HR: 76 (09-08-19 @ 04:25) (72 - 87)  BP: 123/68 (09-08-19 @ 04:25) (123/68 - 135/88)  RR: 18 (09-08-19 @ 04:25) (16 - 18)  SpO2: 94% (09-08-19 @ 04:25) (94% - 98%)    PHYSICAL EXAM:  NAD, Alert  [Right hip sensation grossly intact to light touch; (+) DF/PF; (+) Distal Pulses;    LABS:                     12.0   5.5   )-----------( 273      ( 07 Sep 2019 17:32 )             36.9   09-07  141  |  99  |  20  ----------------------------<  151<H>  3.7   |  27  |  0.83    Ca    9.1      07 Sep 2019 17:32  PT/INR - ( 07 Sep 2019 17:32 )   PT: 13.7 sec;   INR: 1.20 ratio    PTT - ( 07 Sep 2019 17:32 )  PTT:33.3 sec

## 2019-09-08 NOTE — PROGRESS NOTE ADULT - SUBJECTIVE AND OBJECTIVE BOX
Patient is a 87y old  Female who presents with a chief complaint of right femoral neck fracture.        MEDICATIONS  (STANDING):  ascorbic acid 500 milliGRAM(s) Oral two times a day  aspirin enteric coated 81 milliGRAM(s) Oral daily  atorvastatin 20 milliGRAM(s) Oral at bedtime  clindamycin IVPB 900 milliGRAM(s) IV Intermittent every 8 hours  docusate sodium 100 milliGRAM(s) Oral three times a day  enoxaparin Injectable 40 milliGRAM(s) SubCutaneous every 24 hours  ferrous    sulfate 325 milliGRAM(s) Oral three times a day with meals  folic acid 1 milliGRAM(s) Oral daily  lactated ringers. 1000 milliLiter(s) (75 mL/Hr) IV Continuous <Continuous>  multivitamin 1 Tablet(s) Oral daily  verapamil 120 milliGRAM(s) Oral two times a day    MEDICATIONS  (PRN):  acetaminophen   Tablet .. 650 milliGRAM(s) Oral every 6 hours PRN Temp greater or equal to 38C (100.4F)  benzocaine 15 mG/menthol 3.6 mG Lozenge 1 Lozenge Oral every 6 hours PRN Sore Throat  diphenhydrAMINE 25 milliGRAM(s) Oral at bedtime PRN Insomnia  HYDROmorphone  Injectable 0.5 milliGRAM(s) IV Push every 3 hours PRN Severe Pain (7 - 10)  ondansetron Injectable 4 milliGRAM(s) IV Push every 6 hours PRN Nausea and/or Vomiting  oxyCODONE    IR 10 milliGRAM(s) Oral every 4 hours PRN Moderate Pain (4 - 6)  oxyCODONE    IR 5 milliGRAM(s) Oral every 4 hours PRN Mild Pain (1 - 3)      Allergies    amoxicillin (Rash)    VITALS:   T(C): 36.5 (19 @ 17:05), Max: 37.2 (19 @ 07:48)  HR: 83 (19 @ 17:30) (76 - 89)  BP: 123/62 (19 @ 17:30) (111/67 - 134/74)  RR: 16 (19 @ 17:30) (16 - 20)  SpO2: 95% (19 @ 17:30) (94% - 100%)  Wt(kg): --    PHYSICAL EXAM:  GENERAL: NAD, well nourished and conversant  HEAD:  Atraumatic  EYES: EOM, PERRLA, conjunctiva pink and sclera white  ENT: No tonsillar erythema, exudates, or enlargement, moist mucous membranes, good dentition, no lesions  NECK: Supple, No JVD, normal thyroid, carotids with normal upstrokes and no bruits  CHEST/LUNG: Clear to auscultation bilaterally, No rales, rhonchi, wheezing, or rubs  HEART: Regular rate and rhythm, No murmurs, rubs, or gallops  ABDOMEN: Soft, nondistended, no masses, guarding, tenderness or rebound, bowel sounds present  EXTREMITIES:  2+ Peripheral Pulses, No clubbing, cyanosis, or edema. No arthritis of shoulders, elbows, hands, hips, knees, ankles, or feet. No DJD C spine, T spine, or L/S spine  LYMPH: No lymphadenopathy noted  SKIN: No rashes or lesions  NERVOUS SYSTEM:  Alert & Oriented X3, normal cognitive function. Motor Strength 5/5 right upper and right lower.  5/5 left upper and left lower extremities, DTRs 2+ intact and symmetric    LABS:        CBC Full  -  ( 08 Sep 2019 06:51 )  WBC Count : 5.1 K/uL  RBC Count : 3.58 M/uL  Hemoglobin : 11.0 g/dL  Hematocrit : 32.1 %  Platelet Count - Automated : 250 K/uL  Mean Cell Volume : 89.4 fl  Mean Cell Hemoglobin : 30.7 pg  Mean Cell Hemoglobin Concentration : 34.3 gm/dL  Auto Neutrophil # : x  Auto Lymphocyte # : x  Auto Monocyte # : x  Auto Eosinophil # : x  Auto Basophil # : x  Auto Neutrophil % : x  Auto Lymphocyte % : x  Auto Monocyte % : x  Auto Eosinophil % : x  Auto Basophil % : x        141  |  102  |  18  ----------------------------<  94  3.9   |  26  |  0.68    Ca    8.9      08 Sep 2019 06:51        PT/INR - ( 08 Sep 2019 06:51 )   PT: 13.0 sec;   INR: 1.14 ratio         PTT - ( 08 Sep 2019 06:51 )  PTT:30.7 sec  Urinalysis Basic - ( 07 Sep 2019 20:26 )    Color: Light Yellow / Appearance: Clear / S.017 / pH: x  Gluc: x / Ketone: Negative  / Bili: Negative / Urobili: Negative   Blood: x / Protein: Trace / Nitrite: Negative   Leuk Esterase: Moderate / RBC: 6 /hpf / WBC 13 /HPF   Sq Epi: x / Non Sq Epi: 0 /hpf / Bacteria: Negative      CAPILLARY BLOOD GLUCOSE          RADIOLOGY & ADDITIONAL TESTS:      Consultant(s):    Care Discussed with Consultants/Other Providers [ ] YES  [ ] NO This is the first [facility] admission for this 87-year-old female.  She was in her garage in her new apartment building and slipped on the pavement and was able to get up two days ago.  Shortly thereafter, she developed localized pain in her right hip which persisted.  She went to a walk-in urgent care center and was told that her leg was normal because she can still walk.  She persisted and came to the emergency room for evaluation because of right hip pain and x-rays were unremarkable, but CT scan shows a right subcapital femoral neck fracture which is impacted which is what allows her to walk.  The area is not displaced because it is impacted, but it is unstable and surgical stabilization has been advised. She underwent CRPP ORIF on 19 and is seen post op feeling well and with no medical complications to date.  The patient has moderate pain and otherwise has no other regions of trauma or new medical complaints.          MEDICATIONS  (STANDING):  ascorbic acid 500 milliGRAM(s) Oral two times a day  aspirin enteric coated 81 milliGRAM(s) Oral daily  atorvastatin 20 milliGRAM(s) Oral at bedtime  clindamycin IVPB 900 milliGRAM(s) IV Intermittent every 8 hours  docusate sodium 100 milliGRAM(s) Oral three times a day  enoxaparin Injectable 40 milliGRAM(s) SubCutaneous every 24 hours  ferrous    sulfate 325 milliGRAM(s) Oral three times a day with meals  folic acid 1 milliGRAM(s) Oral daily  lactated ringers. 1000 milliLiter(s) (75 mL/Hr) IV Continuous <Continuous>  multivitamin 1 Tablet(s) Oral daily  verapamil 120 milliGRAM(s) Oral two times a day    MEDICATIONS  (PRN):  acetaminophen   Tablet .. 650 milliGRAM(s) Oral every 6 hours PRN Temp greater or equal to 38C (100.4F)  benzocaine 15 mG/menthol 3.6 mG Lozenge 1 Lozenge Oral every 6 hours PRN Sore Throat  diphenhydrAMINE 25 milliGRAM(s) Oral at bedtime PRN Insomnia  HYDROmorphone  Injectable 0.5 milliGRAM(s) IV Push every 3 hours PRN Severe Pain (7 - 10)  ondansetron Injectable 4 milliGRAM(s) IV Push every 6 hours PRN Nausea and/or Vomiting  oxyCODONE    IR 10 milliGRAM(s) Oral every 4 hours PRN Moderate Pain (4 - 6)  oxyCODONE    IR 5 milliGRAM(s) Oral every 4 hours PRN Mild Pain (1 - 3)      Allergies    amoxicillin (Rash)    VITALS:   T(C): 36.5 (19 @ 17:05), Max: 37.2 (19 @ 07:48)  HR: 83 (19 @ 17:30) (76 - 89)  BP: 123/62 (19 @ 17:30) (111/67 - 134/74)  RR: 16 (19 @ 17:30) (16 - 20)  SpO2: 95% (19 @ 17:30) (94% - 100%)  Wt(kg): --    PHYSICAL EXAM:  GENERAL: NAD, well nourished and conversant  HEAD:  Atraumatic  EYES: EOM, PERRLA, conjunctiva pink and sclera white  ENT: No tonsillar erythema, exudates, or enlargement, moist mucous membranes, good dentition, no lesions  NECK: Supple, No JVD, normal thyroid, carotids with normal upstrokes and no bruits  CHEST/LUNG: Clear to auscultation bilaterally, No rales, rhonchi, wheezing, or rubs  HEART: Regular rate and rhythm, No murmurs, rubs, or gallops  ABDOMEN: Soft, nondistended, no masses, guarding, tenderness or rebound, bowel sounds present  EXTREMITIES:  2+ Peripheral Pulses, No clubbing, cyanosis, or edema. No arthritis of shoulders, elbows, hands, hips, knees, ankles, or feet. No DJD C spine, T spine, or L/S spine  LYMPH: No lymphadenopathy noted  SKIN: No rashes or lesions  NERVOUS SYSTEM:  Alert & Oriented X3, normal cognitive function. Motor Strength 5/5 right upper and right lower.  5/5 left upper and left lower extremities, DTRs 2+ intact and symmetric    LABS:        CBC Full  -  ( 08 Sep 2019 06:51 )  WBC Count : 5.1 K/uL  RBC Count : 3.58 M/uL  Hemoglobin : 11.0 g/dL  Hematocrit : 32.1 %  Platelet Count - Automated : 250 K/uL  Mean Cell Volume : 89.4 fl  Mean Cell Hemoglobin : 30.7 pg  Mean Cell Hemoglobin Concentration : 34.3 gm/dL  Auto Neutrophil # : x  Auto Lymphocyte # : x  Auto Monocyte # : x  Auto Eosinophil # : x  Auto Basophil # : x  Auto Neutrophil % : x  Auto Lymphocyte % : x  Auto Monocyte % : x  Auto Eosinophil % : x  Auto Basophil % : x        141  |  102  |  18  ----------------------------<  94  3.9   |  26  |  0.68    Ca    8.9      08 Sep 2019 06:51        PT/INR - ( 08 Sep 2019 06:51 )   PT: 13.0 sec;   INR: 1.14 ratio         PTT - ( 08 Sep 2019 06:51 )  PTT:30.7 sec  Urinalysis Basic - ( 07 Sep 2019 20:26 )    Color: Light Yellow / Appearance: Clear / S.017 / pH: x  Gluc: x / Ketone: Negative  / Bili: Negative / Urobili: Negative   Blood: x / Protein: Trace / Nitrite: Negative   Leuk Esterase: Moderate / RBC: 6 /hpf / WBC 13 /HPF   Sq Epi: x / Non Sq Epi: 0 /hpf / Bacteria: Negative      CAPILLARY BLOOD GLUCOSE          RADIOLOGY & ADDITIONAL TESTS:      Consultant(s):    Care Discussed with Consultants/Other Providers [ ] YES  [ ] NO

## 2019-09-09 ENCOUNTER — TRANSCRIPTION ENCOUNTER (OUTPATIENT)
Age: 84
End: 2019-09-09

## 2019-09-09 DIAGNOSIS — E78.5 HYPERLIPIDEMIA, UNSPECIFIED: ICD-10-CM

## 2019-09-09 DIAGNOSIS — S72.044A NONDISPLACED FRACTURE OF BASE OF NECK OF RIGHT FEMUR, INITIAL ENCOUNTER FOR CLOSED FRACTURE: ICD-10-CM

## 2019-09-09 DIAGNOSIS — I10 ESSENTIAL (PRIMARY) HYPERTENSION: ICD-10-CM

## 2019-09-09 LAB
ANION GAP SERPL CALC-SCNC: 10 MMOL/L — SIGNIFICANT CHANGE UP (ref 5–17)
BASOPHILS # BLD AUTO: 0.01 K/UL — SIGNIFICANT CHANGE UP (ref 0–0.2)
BASOPHILS NFR BLD AUTO: 0.2 % — SIGNIFICANT CHANGE UP (ref 0–2)
BUN SERPL-MCNC: 23 MG/DL — SIGNIFICANT CHANGE UP (ref 7–23)
CALCIUM SERPL-MCNC: 9.3 MG/DL — SIGNIFICANT CHANGE UP (ref 8.4–10.5)
CHLORIDE SERPL-SCNC: 101 MMOL/L — SIGNIFICANT CHANGE UP (ref 96–108)
CO2 SERPL-SCNC: 26 MMOL/L — SIGNIFICANT CHANGE UP (ref 22–31)
CREAT SERPL-MCNC: 0.67 MG/DL — SIGNIFICANT CHANGE UP (ref 0.5–1.3)
EOSINOPHIL # BLD AUTO: 0.1 K/UL — SIGNIFICANT CHANGE UP (ref 0–0.5)
EOSINOPHIL NFR BLD AUTO: 1.8 % — SIGNIFICANT CHANGE UP (ref 0–6)
GLUCOSE SERPL-MCNC: 116 MG/DL — HIGH (ref 70–99)
HCT VFR BLD CALC: 29.7 % — LOW (ref 34.5–45)
HGB BLD-MCNC: 9.8 G/DL — LOW (ref 11.5–15.5)
IMM GRANULOCYTES NFR BLD AUTO: 0.4 % — SIGNIFICANT CHANGE UP (ref 0–1.5)
LYMPHOCYTES # BLD AUTO: 0.62 K/UL — LOW (ref 1–3.3)
LYMPHOCYTES # BLD AUTO: 10.9 % — LOW (ref 13–44)
MCHC RBC-ENTMCNC: 29.2 PG — SIGNIFICANT CHANGE UP (ref 27–34)
MCHC RBC-ENTMCNC: 33 GM/DL — SIGNIFICANT CHANGE UP (ref 32–36)
MCV RBC AUTO: 88.4 FL — SIGNIFICANT CHANGE UP (ref 80–100)
MONOCYTES # BLD AUTO: 0.35 K/UL — SIGNIFICANT CHANGE UP (ref 0–0.9)
MONOCYTES NFR BLD AUTO: 6.1 % — SIGNIFICANT CHANGE UP (ref 2–14)
NEUTROPHILS # BLD AUTO: 4.61 K/UL — SIGNIFICANT CHANGE UP (ref 1.8–7.4)
NEUTROPHILS NFR BLD AUTO: 80.6 % — HIGH (ref 43–77)
PLATELET # BLD AUTO: 272 K/UL — SIGNIFICANT CHANGE UP (ref 150–400)
POTASSIUM SERPL-MCNC: 4.1 MMOL/L — SIGNIFICANT CHANGE UP (ref 3.5–5.3)
POTASSIUM SERPL-SCNC: 4.1 MMOL/L — SIGNIFICANT CHANGE UP (ref 3.5–5.3)
RBC # BLD: 3.36 M/UL — LOW (ref 3.8–5.2)
RBC # FLD: 12.9 % — SIGNIFICANT CHANGE UP (ref 10.3–14.5)
SODIUM SERPL-SCNC: 137 MMOL/L — SIGNIFICANT CHANGE UP (ref 135–145)
WBC # BLD: 5.71 K/UL — SIGNIFICANT CHANGE UP (ref 3.8–10.5)
WBC # FLD AUTO: 5.71 K/UL — SIGNIFICANT CHANGE UP (ref 3.8–10.5)

## 2019-09-09 RX ORDER — ACETAMINOPHEN 500 MG
2 TABLET ORAL
Qty: 0 | Refills: 0 | DISCHARGE
Start: 2019-09-09

## 2019-09-09 RX ORDER — ASCORBIC ACID 60 MG
1 TABLET,CHEWABLE ORAL
Qty: 0 | Refills: 0 | DISCHARGE
Start: 2019-09-09

## 2019-09-09 RX ORDER — OXYCODONE HYDROCHLORIDE 5 MG/1
1 TABLET ORAL
Qty: 0 | Refills: 0 | DISCHARGE
Start: 2019-09-09

## 2019-09-09 RX ORDER — ENOXAPARIN SODIUM 100 MG/ML
40 INJECTION SUBCUTANEOUS
Qty: 0 | Refills: 0 | DISCHARGE
Start: 2019-09-09

## 2019-09-09 RX ORDER — FOLIC ACID 0.8 MG
1 TABLET ORAL
Qty: 0 | Refills: 0 | DISCHARGE
Start: 2019-09-09

## 2019-09-09 RX ORDER — OXYCODONE HYDROCHLORIDE 5 MG/1
5 TABLET ORAL EVERY 4 HOURS
Refills: 0 | Status: DISCONTINUED | OUTPATIENT
Start: 2019-09-09 | End: 2019-09-11

## 2019-09-09 RX ORDER — TRAMADOL HYDROCHLORIDE 50 MG/1
50 TABLET ORAL EVERY 6 HOURS
Refills: 0 | Status: DISCONTINUED | OUTPATIENT
Start: 2019-09-09 | End: 2019-09-09

## 2019-09-09 RX ORDER — ACETAMINOPHEN 500 MG
650 TABLET ORAL EVERY 6 HOURS
Refills: 0 | Status: DISCONTINUED | OUTPATIENT
Start: 2019-09-09 | End: 2019-09-11

## 2019-09-09 RX ORDER — DRONEDARONE 400 MG/1
400 TABLET, FILM COATED ORAL
Refills: 0 | Status: DISCONTINUED | OUTPATIENT
Start: 2019-09-09 | End: 2019-09-11

## 2019-09-09 RX ORDER — DOCUSATE SODIUM 100 MG
1 CAPSULE ORAL
Qty: 0 | Refills: 0 | DISCHARGE
Start: 2019-09-09

## 2019-09-09 RX ORDER — OXYCODONE HYDROCHLORIDE 5 MG/1
2.5 TABLET ORAL EVERY 4 HOURS
Refills: 0 | Status: DISCONTINUED | OUTPATIENT
Start: 2019-09-09 | End: 2019-09-11

## 2019-09-09 RX ORDER — OXYCODONE HYDROCHLORIDE 5 MG/1
2.5 TABLET ORAL
Qty: 0 | Refills: 0 | DISCHARGE
Start: 2019-09-09

## 2019-09-09 RX ADMIN — Medication 325 MILLIGRAM(S): at 12:34

## 2019-09-09 RX ADMIN — Medication 120 MILLIGRAM(S): at 17:02

## 2019-09-09 RX ADMIN — Medication 1 TABLET(S): at 12:35

## 2019-09-09 RX ADMIN — Medication 100 MILLIGRAM(S): at 08:44

## 2019-09-09 RX ADMIN — DRONEDARONE 400 MILLIGRAM(S): 400 TABLET, FILM COATED ORAL at 18:39

## 2019-09-09 RX ADMIN — Medication 100 MILLIGRAM(S): at 12:35

## 2019-09-09 RX ADMIN — ENOXAPARIN SODIUM 40 MILLIGRAM(S): 100 INJECTION SUBCUTANEOUS at 12:34

## 2019-09-09 RX ADMIN — Medication 325 MILLIGRAM(S): at 08:44

## 2019-09-09 RX ADMIN — ATORVASTATIN CALCIUM 20 MILLIGRAM(S): 80 TABLET, FILM COATED ORAL at 21:41

## 2019-09-09 RX ADMIN — Medication 650 MILLIGRAM(S): at 22:12

## 2019-09-09 RX ADMIN — Medication 100 MILLIGRAM(S): at 00:46

## 2019-09-09 RX ADMIN — Medication 120 MILLIGRAM(S): at 05:29

## 2019-09-09 RX ADMIN — Medication 650 MILLIGRAM(S): at 08:44

## 2019-09-09 RX ADMIN — Medication 650 MILLIGRAM(S): at 09:15

## 2019-09-09 RX ADMIN — Medication 1 MILLIGRAM(S): at 12:35

## 2019-09-09 RX ADMIN — Medication 650 MILLIGRAM(S): at 21:42

## 2019-09-09 RX ADMIN — Medication 500 MILLIGRAM(S): at 05:29

## 2019-09-09 RX ADMIN — Medication 81 MILLIGRAM(S): at 12:34

## 2019-09-09 NOTE — PHYSICAL THERAPY INITIAL EVALUATION ADULT - PERTINENT HX OF CURRENT PROBLEM, REHAB EVAL
88y/o F presents to ED s/p [facility] fall 2 days ago after being called by urgent care that her hip xrays demonstrated fractured hip - c/o severe R Hip pain when ambulating but has been walking last two days.  Pt denies head hit or LOC.  denies recent fall since then. Localizes pain to R hip/femur. Pt is a community ambulator without assistive devices

## 2019-09-09 NOTE — DISCHARGE NOTE PROVIDER - NSDCCPTREATMENT_GEN_ALL_CORE_FT
PRINCIPAL PROCEDURE  Procedure: Closed reduction and percutaneous pinning of right hip  Findings and Treatment:

## 2019-09-09 NOTE — PHYSICAL THERAPY INITIAL EVALUATION ADULT - ASSISTIVE DEVICE FOR TRANSFER: STAND/SIT, REHAB EVAL
62 yo M w/ DM, HTN, HLD, dysphagia 2/2 CVA presents w/ clogged PEG tube x 1 day.  Pt had PEG tube placed 8 months ago at [facility].  ROS negative for fevers, chills, nausea, vomiting, cough, CP, SOB, abdominal pain, diarrhea. rolling walker

## 2019-09-09 NOTE — DISCHARGE NOTE PROVIDER - NSDCCPCAREPLAN_GEN_ALL_CORE_FT
PRINCIPAL DISCHARGE DIAGNOSIS  Diagnosis: Closed nondisplaced basicervical fracture of right femur, initial encounter  Assessment and Plan of Treatment:

## 2019-09-09 NOTE — PHYSICAL THERAPY INITIAL EVALUATION ADULT - ADDITIONAL COMMENTS
Pt lives in elevator apartment alone. Prior to admission pt was independent with all functional mobility and did not use an AD to ambulate. Pt drivers a car.

## 2019-09-09 NOTE — PROGRESS NOTE ADULT - PROBLEM SELECTOR PLAN 1
Patient tolerated surgery well  continue physical therapy  Pain meds as need  follow for oversedation

## 2019-09-09 NOTE — DISCHARGE NOTE PROVIDER - HOSPITAL COURSE
This is a 87 year old Female with PMHx of AVR, HTN admitted to [facility] on 9/7/19 with a R hip fracture.  Patient evaluated and cleared by Medicine for operative procedure.  On 9/8, patient underwent an uncomplicated R hip CRPP.  Evaluated and treated by PT, recommended for Subacute Rehab.  Remain of hospital stay unremarkable, and patient discharged to Subacute Rehab when bed available.

## 2019-09-09 NOTE — PROGRESS NOTE ADULT - ATTENDING COMMENTS
No medical complications post-op to date and to proceed with physical therapy, as tolerated. Continues pulmonary toilet to lessen atelectasis, leg exercises as taught to lessen the risk of DVT and supervised pain medications for post-op pain control. DC planning home vs rehab.

## 2019-09-09 NOTE — DISCHARGE NOTE PROVIDER - NSDCFUADDINST_GEN_ALL_CORE_FT
Please follow up with Dr. Gerardo after your discharge from Subacute Rehab (2 weeks, call for appointment).  PT-weight bearing as tolerated.  Keep dressing clean, dry and intact.  Please take Lovenox daily x 6 weeks total for dvt prevention.  Have doctor remove any staples/sutures postop day 14 (if applicable), and apply steristrips as needed.  Please follow up with your PMD 1 month after your hospital discharge.

## 2019-09-09 NOTE — DISCHARGE NOTE PROVIDER - CARE PROVIDER_API CALL
Fox Gerardo (MD)  Orthopaedic Surgery  [address]  [place] [address]  Phone: [phone]  Fax: [phone]  Follow Up Time:

## 2019-09-09 NOTE — DISCHARGE NOTE PROVIDER - NSDCACTIVITY_GEN_ALL_CORE
Walking - Outdoors allowed/Walking - Indoors allowed/Do not drive or operate machinery/No heavy lifting/straining/Showering allowed/Do not make important decisions/Stairs allowed Walking - Indoors allowed/No heavy lifting/straining/Stairs allowed/Do not drive or operate machinery/Do not make important decisions/Walking - Outdoors allowed

## 2019-09-09 NOTE — PROGRESS NOTE ADULT - SUBJECTIVE AND OBJECTIVE BOX
This is the first [facility] admission for this 87-year-old female.  She was in her garage in her new apartment building and slipped on the pavement and was able to get up two days ago.  Shortly thereafter, she developed localized pain in her right hip which persisted.  She went to a walk-in urgent care center and was told that her leg was normal because she can still walk.  She persisted and came to the emergency room for evaluation because of right hip pain and x-rays were unremarkable, but CT scan shows a right subcapital femoral neck fracture which is impacted which is what allows her to walk.  The area is not displaced because it is impacted, but it is unstable and surgical stabilization has been advised. She underwent CRPP ORIF on 19 and is seen post op feeling well and with no medical complications to date.  The patient has moderate pain and otherwise has no other regions of trauma or new medical complaints. seen today OOB sitting in chair. states pain has been well managed.      MEDICATIONS  (STANDING):  ascorbic acid 500 milliGRAM(s) Oral two times a day  aspirin enteric coated 81 milliGRAM(s) Oral daily  atorvastatin 20 milliGRAM(s) Oral at bedtime  docusate sodium 100 milliGRAM(s) Oral three times a day  dronedarone 400 milliGRAM(s) Oral two times a day  enoxaparin Injectable 40 milliGRAM(s) SubCutaneous every 24 hours  ferrous    sulfate 325 milliGRAM(s) Oral three times a day with meals  folic acid 1 milliGRAM(s) Oral daily  lactated ringers. 1000 milliLiter(s) (75 mL/Hr) IV Continuous <Continuous>  multivitamin 1 Tablet(s) Oral daily  verapamil 120 milliGRAM(s) Oral two times a day    MEDICATIONS  (PRN):  acetaminophen   Tablet .. 650 milliGRAM(s) Oral every 6 hours PRN Temp greater or equal to 38C (100.4F)  benzocaine 15 mG/menthol 3.6 mG Lozenge 1 Lozenge Oral every 6 hours PRN Sore Throat  diphenhydrAMINE 25 milliGRAM(s) Oral at bedtime PRN Insomnia  oxyCODONE    IR 2.5 milliGRAM(s) Oral every 4 hours PRN Moderate Pain (4 - 6)  oxyCODONE    IR 5 milliGRAM(s) Oral every 4 hours PRN Severe Pain (7 - 10)          VITALS:   T(C): 36.7 (19 @ 14:30), Max: 37 (19 @ 08:36)  HR: 86 (19 @ 14:30) (76 - 90)  BP: 95/50 (19 @ 14:30) (95/50 - 140/61)  RR: 18 (19 @ 14:30) (16 - 20)  SpO2: 94% (19 @ 14:30) (93% - 100%)  Wt(kg): --    PHYSICAL EXAM:  GENERAL: NAD, well nourished and conversant  HEAD:  Atraumatic  EYES: EOM, PERRLA, conjunctiva pink and sclera white  ENT: No tonsillar erythema, exudates, or enlargement, moist mucous membranes, good dentition, no lesions  NECK: Supple, No JVD, normal thyroid, carotids with normal upstrokes and no bruits  CHEST/LUNG: Clear to auscultation bilaterally, No rales, rhonchi, wheezing, or rubs  HEART: Regular rate and rhythm, No murmurs, rubs, or gallops  ABDOMEN: Soft, nondistended, no masses, guarding, tenderness or rebound, bowel sounds present  EXTREMITIES:  decreased ROM of right leg  LYMPH: No lymphadenopathy noted  SKIN: No rashes or lesions  NERVOUS SYSTEM:  Alert & Oriented X3, normal cognitive function. Motor Strength 5/5 right upper and right lower.  5/5 left upper and left lower extremities, DTRs 2+ intact and symmetric      LABS:        CBC Full  -  ( 09 Sep 2019 10:12 )  WBC Count : 5.71 K/uL  RBC Count : 3.36 M/uL  Hemoglobin : 9.8 g/dL  Hematocrit : 29.7 %  Platelet Count - Automated : 272 K/uL  Mean Cell Volume : 88.4 fl  Mean Cell Hemoglobin : 29.2 pg  Mean Cell Hemoglobin Concentration : 33.0 gm/dL  Auto Neutrophil # : 4.61 K/uL  Auto Lymphocyte # : 0.62 K/uL  Auto Monocyte # : 0.35 K/uL  Auto Eosinophil # : 0.10 K/uL  Auto Basophil # : 0.01 K/uL  Auto Neutrophil % : 80.6 %  Auto Lymphocyte % : 10.9 %  Auto Monocyte % : 6.1 %  Auto Eosinophil % : 1.8 %  Auto Basophil % : 0.2 %        137  |  101  |  23  ----------------------------<  116<H>  4.1   |  26  |  0.67    Ca    9.3      09 Sep 2019 07:09        PT/INR - ( 08 Sep 2019 17:30 )   PT: 13.5 sec;   INR: 1.17 ratio         PTT - ( 08 Sep 2019 17:30 )  PTT:33.5 sec  Urinalysis Basic - ( 07 Sep 2019 20:26 )    Color: Light Yellow / Appearance: Clear / S.017 / pH: x  Gluc: x / Ketone: Negative  / Bili: Negative / Urobili: Negative   Blood: x / Protein: Trace / Nitrite: Negative   Leuk Esterase: Moderate / RBC: 6 /hpf / WBC 13 /HPF   Sq Epi: x / Non Sq Epi: 0 /hpf / Bacteria: Negative      CAPILLARY BLOOD GLUCOSE          RADIOLOGY & ADDITIONAL TESTS:

## 2019-09-09 NOTE — PHYSICAL THERAPY INITIAL EVALUATION ADULT - PRECAUTIONS/LIMITATIONS, REHAB EVAL
Had aortic valve replacement 8 years ago and recently moved from [place].CTPelvis: Impacted subcapital right femoral neck fracture. XRayRHip: Impacted right subcapital femoral neck fracture. Mild right hip arthrosis.CXR (-)/surgical precautions/fall precautions

## 2019-09-10 LAB
ANION GAP SERPL CALC-SCNC: 9 MMOL/L — SIGNIFICANT CHANGE UP (ref 5–17)
BUN SERPL-MCNC: 20 MG/DL — SIGNIFICANT CHANGE UP (ref 7–23)
CALCIUM SERPL-MCNC: 9.1 MG/DL — SIGNIFICANT CHANGE UP (ref 8.4–10.5)
CHLORIDE SERPL-SCNC: 99 MMOL/L — SIGNIFICANT CHANGE UP (ref 96–108)
CO2 SERPL-SCNC: 28 MMOL/L — SIGNIFICANT CHANGE UP (ref 22–31)
CREAT SERPL-MCNC: 0.7 MG/DL — SIGNIFICANT CHANGE UP (ref 0.5–1.3)
GLUCOSE SERPL-MCNC: 87 MG/DL — SIGNIFICANT CHANGE UP (ref 70–99)
POTASSIUM SERPL-MCNC: 3.6 MMOL/L — SIGNIFICANT CHANGE UP (ref 3.5–5.3)
POTASSIUM SERPL-SCNC: 3.6 MMOL/L — SIGNIFICANT CHANGE UP (ref 3.5–5.3)
SODIUM SERPL-SCNC: 136 MMOL/L — SIGNIFICANT CHANGE UP (ref 135–145)

## 2019-09-10 RX ORDER — LANOLIN ALCOHOL/MO/W.PET/CERES
3 CREAM (GRAM) TOPICAL AT BEDTIME
Refills: 0 | Status: DISCONTINUED | OUTPATIENT
Start: 2019-09-10 | End: 2019-09-11

## 2019-09-10 RX ORDER — CALCIUM CARBONATE 500(1250)
1 TABLET ORAL THREE TIMES A DAY
Refills: 0 | Status: DISCONTINUED | OUTPATIENT
Start: 2019-09-10 | End: 2019-09-11

## 2019-09-10 RX ADMIN — OXYCODONE HYDROCHLORIDE 5 MILLIGRAM(S): 5 TABLET ORAL at 22:53

## 2019-09-10 RX ADMIN — Medication 120 MILLIGRAM(S): at 06:16

## 2019-09-10 RX ADMIN — DRONEDARONE 400 MILLIGRAM(S): 400 TABLET, FILM COATED ORAL at 06:16

## 2019-09-10 RX ADMIN — Medication 3 MILLIGRAM(S): at 01:43

## 2019-09-10 RX ADMIN — Medication 500 MILLIGRAM(S): at 18:26

## 2019-09-10 RX ADMIN — Medication 1 TABLET(S): at 11:26

## 2019-09-10 RX ADMIN — OXYCODONE HYDROCHLORIDE 5 MILLIGRAM(S): 5 TABLET ORAL at 23:23

## 2019-09-10 RX ADMIN — OXYCODONE HYDROCHLORIDE 2.5 MILLIGRAM(S): 5 TABLET ORAL at 08:10

## 2019-09-10 RX ADMIN — Medication 1 TABLET(S): at 21:55

## 2019-09-10 RX ADMIN — Medication 1 MILLIGRAM(S): at 11:26

## 2019-09-10 RX ADMIN — Medication 325 MILLIGRAM(S): at 18:26

## 2019-09-10 RX ADMIN — Medication 325 MILLIGRAM(S): at 09:06

## 2019-09-10 RX ADMIN — DRONEDARONE 400 MILLIGRAM(S): 400 TABLET, FILM COATED ORAL at 18:26

## 2019-09-10 RX ADMIN — OXYCODONE HYDROCHLORIDE 2.5 MILLIGRAM(S): 5 TABLET ORAL at 07:40

## 2019-09-10 RX ADMIN — Medication 120 MILLIGRAM(S): at 18:26

## 2019-09-10 RX ADMIN — Medication 650 MILLIGRAM(S): at 21:57

## 2019-09-10 RX ADMIN — ENOXAPARIN SODIUM 40 MILLIGRAM(S): 100 INJECTION SUBCUTANEOUS at 11:27

## 2019-09-10 RX ADMIN — Medication 81 MILLIGRAM(S): at 11:26

## 2019-09-10 RX ADMIN — Medication 650 MILLIGRAM(S): at 22:30

## 2019-09-10 RX ADMIN — Medication 500 MILLIGRAM(S): at 06:16

## 2019-09-10 NOTE — PROGRESS NOTE ADULT - SUBJECTIVE AND OBJECTIVE BOX
This is the first [facility] admission for this 87-year-old female.  She was in her garage in her new apartment building and slipped on the pavement and was able to get up two days ago.  Shortly thereafter, she developed localized pain in her right hip which persisted.  She went to a walk-in urgent care center and was told that her leg was normal because she can still walk.  She persisted and came to the emergency room for evaluation because of right hip pain and x-rays were unremarkable, but CT scan shows a right subcapital femoral neck fracture which is impacted which is what allows her to walk.  The area is not displaced because it is impacted, but it is unstable and surgical stabilization has been advised. She underwent CRPP ORIF on 09/08/19 and is seen post op feeling well and with no medical complications to date.  The patient has moderate pain and otherwise has no other regions of trauma or new medical complaints. seen today OOB sitting in chair. states pain has been well managed.      MEDICATIONS  (STANDING):  ascorbic acid 500 milliGRAM(s) Oral two times a day  aspirin enteric coated 81 milliGRAM(s) Oral daily  atorvastatin 20 milliGRAM(s) Oral at bedtime  docusate sodium 100 milliGRAM(s) Oral three times a day  dronedarone 400 milliGRAM(s) Oral two times a day  enoxaparin Injectable 40 milliGRAM(s) SubCutaneous every 24 hours  ferrous    sulfate 325 milliGRAM(s) Oral three times a day with meals  folic acid 1 milliGRAM(s) Oral daily  lactated ringers. 1000 milliLiter(s) (75 mL/Hr) IV Continuous <Continuous>  melatonin 3 milliGRAM(s) Oral at bedtime  multivitamin 1 Tablet(s) Oral daily  verapamil 120 milliGRAM(s) Oral two times a day    MEDICATIONS  (PRN):  acetaminophen   Tablet .. 650 milliGRAM(s) Oral every 6 hours PRN Temp greater or equal to 38C (100.4F), Mild Pain (1 - 3)  benzocaine 15 mG/menthol 3.6 mG Lozenge 1 Lozenge Oral every 6 hours PRN Sore Throat  diphenhydrAMINE 25 milliGRAM(s) Oral at bedtime PRN Insomnia  oxyCODONE    IR 2.5 milliGRAM(s) Oral every 4 hours PRN Moderate Pain (4 - 6)  oxyCODONE    IR 5 milliGRAM(s) Oral every 4 hours PRN Severe Pain (7 - 10)          Vital Signs Last 24 Hrs  T(C): 36.6 (10 Sep 2019 08:01), Max: 36.8 (09 Sep 2019 23:58)  T(F): 97.9 (10 Sep 2019 08:01), Max: 98.3 (09 Sep 2019 23:58)  HR: 75 (10 Sep 2019 08:01) (73 - 90)  BP: 105/58 (10 Sep 2019 08:01) (95/50 - 135/68)  BP(mean): --  RR: 18 (10 Sep 2019 08:01) (18 - 18)  SpO2: 93% (10 Sep 2019 08:01) (93% - 95%)        PHYSICAL EXAM:  GENERAL: NAD, well nourished and conversant  HEAD:  Atraumatic  EYES: EOM, PERRLA, conjunctiva pink and sclera white  ENT: No tonsillar erythema, exudates, or enlargement, moist mucous membranes, good dentition, no lesions  NECK: Supple, No JVD, normal thyroid, carotids with normal upstrokes and no bruits  CHEST/LUNG: Clear to auscultation bilaterally, No rales, rhonchi, wheezing, or rubs  HEART: Regular rate and rhythm, No murmurs, rubs, or gallops  ABDOMEN: Soft, nondistended, no masses, guarding, tenderness or rebound, bowel sounds present  EXTREMITIES:  decreased ROM of right leg  LYMPH: No lymphadenopathy noted  SKIN: No rashes or lesions  NERVOUS SYSTEM:  Alert & Oriented X3, normal cognitive function. Motor Strength 5/5 right upper and right lower.  5/5 left upper and left lower extremities, DTRs 2+ intact and symmetric                  CBC Full  -  ( 09 Sep 2019 10:12 )  WBC Count : 5.71 K/uL  RBC Count : 3.36 M/uL  Hemoglobin : 9.8 g/dL  Hematocrit : 29.7 %  Platelet Count - Automated : 272 K/uL  Mean Cell Volume : 88.4 fl  Mean Cell Hemoglobin : 29.2 pg  Mean Cell Hemoglobin Concentration : 33.0 gm/dL  Auto Neutrophil # : 4.61 K/uL  Auto Lymphocyte # : 0.62 K/uL  Auto Monocyte # : 0.35 K/uL  Auto Eosinophil # : 0.10 K/uL  Auto Basophil # : 0.01 K/uL  Auto Neutrophil % : 80.6 %  Auto Lymphocyte % : 10.9 %  Auto Monocyte % : 6.1 %  Auto Eosinophil % : 1.8 %  Auto Basophil % : 0.2 %    09-10    136  |  99  |  20  ----------------------------<  87  3.6   |  28  |  0.70    Ca    9.1      10 Sep 2019 07:12        PT/INR - ( 08 Sep 2019 17:30 )   PT: 13.5 sec;   INR: 1.17 ratio         PTT - ( 08 Sep 2019 17:30 )  PTT:33.5 sec    CAPILLARY BLOOD GLUCOSE          RADIOLOGY & ADDITIONAL TESTS: This is the first [facility] admission for this 87-year-old female.  She was in her garage in her new apartment building and slipped on the pavement and was able to get up two days ago.  Shortly thereafter, she developed localized pain in her right hip which persisted.  She went to a walk-in urgent care center and was told that her leg was normal because she can still walk.  She persisted and came to the emergency room for evaluation because of right hip pain and x-rays were unremarkable, but CT scan shows a right subcapital femoral neck fracture which is impacted which is what allows her to walk.  The area is not displaced because it is impacted, but it is unstable and surgical stabilization has been advised. She underwent CRPP ORIF on 09/08/19 and is seen post op feeling well and with no medical complications to date.  The patient has moderate pain and otherwise has no other regions of trauma or new medical complaints. Seen today OOB sitting in chair and states that her pain has been well managed. Scheduled to transfer to rehabilitation tomorrow in [place].      MEDICATIONS  (STANDING):  ascorbic acid 500 milliGRAM(s) Oral two times a day  aspirin enteric coated 81 milliGRAM(s) Oral daily  atorvastatin 20 milliGRAM(s) Oral at bedtime  docusate sodium 100 milliGRAM(s) Oral three times a day  dronedarone 400 milliGRAM(s) Oral two times a day  enoxaparin Injectable 40 milliGRAM(s) SubCutaneous every 24 hours  ferrous    sulfate 325 milliGRAM(s) Oral three times a day with meals  folic acid 1 milliGRAM(s) Oral daily  lactated ringers. 1000 milliLiter(s) (75 mL/Hr) IV Continuous <Continuous>  melatonin 3 milliGRAM(s) Oral at bedtime  multivitamin 1 Tablet(s) Oral daily  verapamil 120 milliGRAM(s) Oral two times a day    MEDICATIONS  (PRN):  acetaminophen   Tablet .. 650 milliGRAM(s) Oral every 6 hours PRN Temp greater or equal to 38C (100.4F), Mild Pain (1 - 3)  benzocaine 15 mG/menthol 3.6 mG Lozenge 1 Lozenge Oral every 6 hours PRN Sore Throat  diphenhydrAMINE 25 milliGRAM(s) Oral at bedtime PRN Insomnia  oxyCODONE    IR 2.5 milliGRAM(s) Oral every 4 hours PRN Moderate Pain (4 - 6)  oxyCODONE    IR 5 milliGRAM(s) Oral every 4 hours PRN Severe Pain (7 - 10)          Vital Signs Last 24 Hrs  T(C): 36.6 (10 Sep 2019 08:01), Max: 36.8 (09 Sep 2019 23:58)  T(F): 97.9 (10 Sep 2019 08:01), Max: 98.3 (09 Sep 2019 23:58)  HR: 75 (10 Sep 2019 08:01) (73 - 90)  BP: 105/58 (10 Sep 2019 08:01) (95/50 - 135/68)  BP(mean): --  RR: 18 (10 Sep 2019 08:01) (18 - 18)  SpO2: 93% (10 Sep 2019 08:01) (93% - 95%)        PHYSICAL EXAM:  GENERAL: NAD, well nourished and conversant  HEAD:  Atraumatic  EYES: EOM, PERRLA, conjunctiva pink and sclera white  ENT: No tonsillar erythema, exudates, or enlargement, moist mucous membranes, good dentition, no lesions  NECK: Supple, No JVD, normal thyroid, carotids with normal upstrokes and no bruits  CHEST/LUNG: Clear to auscultation bilaterally, No rales, rhonchi, wheezing, or rubs  HEART: Regular rate and rhythm, No murmurs, rubs, or gallops  ABDOMEN: Soft, nondistended, no masses, guarding, tenderness or rebound, bowel sounds present  EXTREMITIES:  decreased ROM of right leg  LYMPH: No lymphadenopathy noted  SKIN: No rashes or lesions  NERVOUS SYSTEM:  Alert & Oriented X3, normal cognitive function. Motor Strength 5/5 right upper and right lower.  5/5 left upper and left lower extremities, DTRs 2+ intact and symmetric                  CBC Full  -  ( 09 Sep 2019 10:12 )  WBC Count : 5.71 K/uL  RBC Count : 3.36 M/uL  Hemoglobin : 9.8 g/dL  Hematocrit : 29.7 %  Platelet Count - Automated : 272 K/uL  Mean Cell Volume : 88.4 fl  Mean Cell Hemoglobin : 29.2 pg  Mean Cell Hemoglobin Concentration : 33.0 gm/dL  Auto Neutrophil # : 4.61 K/uL  Auto Lymphocyte # : 0.62 K/uL  Auto Monocyte # : 0.35 K/uL  Auto Eosinophil # : 0.10 K/uL  Auto Basophil # : 0.01 K/uL  Auto Neutrophil % : 80.6 %  Auto Lymphocyte % : 10.9 %  Auto Monocyte % : 6.1 %  Auto Eosinophil % : 1.8 %  Auto Basophil % : 0.2 %    09-10    136  |  99  |  20  ----------------------------<  87  3.6   |  28  |  0.70    Ca    9.1      10 Sep 2019 07:12        PT/INR - ( 08 Sep 2019 17:30 )   PT: 13.5 sec;   INR: 1.17 ratio         PTT - ( 08 Sep 2019 17:30 )  PTT:33.5 sec    CAPILLARY BLOOD GLUCOSE          RADIOLOGY & ADDITIONAL TESTS:

## 2019-09-10 NOTE — PROGRESS NOTE ADULT - SUBJECTIVE AND OBJECTIVE BOX
Patient is a 87y old  Female who presents with a chief complaint of right femoral neck fracture (09 Sep 2019 14:50)      POST OPERATIVE DAY #:  2  Patient comfortable  No complaints    VS:  T(C): 36.8 (09-10-19 @ 05:04), Max: 37 (09-09-19 @ 08:36)  T(F): 98.3 (09-10-19 @ 05:04), Max: 98.6 (09-09-19 @ 08:36)  HR: 74 (09-10-19 @ 05:04) (73 - 90)  BP: 135/68 (09-10-19 @ 05:04) (95/50 - 140/61)  RR: 18 (09-10-19 @ 05:04) (18 - 18)  SpO2: 93% (09-10-19 @ 05:04) (93% - 96%)  Wt(kg): --      PHYSICAL EXAM:  NAD, Alert  EXT:   R Hip Aquacel Dressing C/D/I  No Calf Tenderness  (+) DF/PF; (+) Distal Pulses;  Sensation: No gross deficits noted  Pulses 2+   B/L, PAS     LABS:                        9.8<L>  5.71  )-----------( 272      ( 09 Sep 2019 10:12 )             29.7<L>    09-09    137  |  101  |  23  ----------------------------<  116<H>  4.1   |  26  |  0.67      PT/INR - ( 08 Sep 2019 17:30 )   PT: 13.5 sec;   INR: 1.17 ratio         PTT - ( 08 Sep 2019 17:30 )  PTT:33.5 sec

## 2019-09-10 NOTE — PROGRESS NOTE ADULT - ATTENDING COMMENTS
No medical complications post-op to date and to proceed with physical therapy, as tolerated. Continues pulmonary toilet to lessen atelectasis, leg exercises as taught to lessen the risk of DVT and supervised pain medications for post-op pain control. DC planning home vs rehab. Beginning to ambulate and doing well. No medical complications post-op to date and to proceed with physical therapy, as tolerated. Continues pulmonary toilet to lessen atelectasis, leg exercises as taught to lessen the risk of DVT and supervised pain medications for post-op pain control. I anticipate transfer to rehabilitation to follow when cleared by orthopedics.

## 2019-09-11 ENCOUNTER — TRANSCRIPTION ENCOUNTER (OUTPATIENT)
Age: 84
End: 2019-09-11

## 2019-09-11 VITALS
RESPIRATION RATE: 18 BRPM | DIASTOLIC BLOOD PRESSURE: 70 MMHG | TEMPERATURE: 98 F | SYSTOLIC BLOOD PRESSURE: 111 MMHG | OXYGEN SATURATION: 95 % | HEART RATE: 75 BPM

## 2019-09-11 LAB
ANION GAP SERPL CALC-SCNC: 10 MMOL/L — SIGNIFICANT CHANGE UP (ref 5–17)
BUN SERPL-MCNC: 20 MG/DL — SIGNIFICANT CHANGE UP (ref 7–23)
CALCIUM SERPL-MCNC: 9.3 MG/DL — SIGNIFICANT CHANGE UP (ref 8.4–10.5)
CHLORIDE SERPL-SCNC: 101 MMOL/L — SIGNIFICANT CHANGE UP (ref 96–108)
CO2 SERPL-SCNC: 28 MMOL/L — SIGNIFICANT CHANGE UP (ref 22–31)
CREAT SERPL-MCNC: 0.79 MG/DL — SIGNIFICANT CHANGE UP (ref 0.5–1.3)
GLUCOSE SERPL-MCNC: 84 MG/DL — SIGNIFICANT CHANGE UP (ref 70–99)
HCT VFR BLD CALC: 28.4 % — LOW (ref 34.5–45)
HGB BLD-MCNC: 9.4 G/DL — LOW (ref 11.5–15.5)
MCHC RBC-ENTMCNC: 29.8 PG — SIGNIFICANT CHANGE UP (ref 27–34)
MCHC RBC-ENTMCNC: 33.3 GM/DL — SIGNIFICANT CHANGE UP (ref 32–36)
MCV RBC AUTO: 89.6 FL — SIGNIFICANT CHANGE UP (ref 80–100)
PLATELET # BLD AUTO: 295 K/UL — SIGNIFICANT CHANGE UP (ref 150–400)
POTASSIUM SERPL-MCNC: 3.9 MMOL/L — SIGNIFICANT CHANGE UP (ref 3.5–5.3)
POTASSIUM SERPL-SCNC: 3.9 MMOL/L — SIGNIFICANT CHANGE UP (ref 3.5–5.3)
RBC # BLD: 3.17 M/UL — LOW (ref 3.8–5.2)
RBC # FLD: 12.1 % — SIGNIFICANT CHANGE UP (ref 10.3–14.5)
SODIUM SERPL-SCNC: 139 MMOL/L — SIGNIFICANT CHANGE UP (ref 135–145)
WBC # BLD: 4.9 K/UL — SIGNIFICANT CHANGE UP (ref 3.8–10.5)
WBC # FLD AUTO: 4.9 K/UL — SIGNIFICANT CHANGE UP (ref 3.8–10.5)

## 2019-09-11 PROCEDURE — 71045 X-RAY EXAM CHEST 1 VIEW: CPT

## 2019-09-11 PROCEDURE — 80048 BASIC METABOLIC PNL TOTAL CA: CPT

## 2019-09-11 PROCEDURE — 72192 CT PELVIS W/O DYE: CPT

## 2019-09-11 PROCEDURE — 85610 PROTHROMBIN TIME: CPT

## 2019-09-11 PROCEDURE — C1769: CPT

## 2019-09-11 PROCEDURE — C1713: CPT

## 2019-09-11 PROCEDURE — 97162 PT EVAL MOD COMPLEX 30 MIN: CPT

## 2019-09-11 PROCEDURE — 73502 X-RAY EXAM HIP UNI 2-3 VIEWS: CPT

## 2019-09-11 PROCEDURE — 76000 FLUOROSCOPY <1 HR PHYS/QHP: CPT

## 2019-09-11 PROCEDURE — 85730 THROMBOPLASTIN TIME PARTIAL: CPT

## 2019-09-11 PROCEDURE — 99285 EMERGENCY DEPT VISIT HI MDM: CPT

## 2019-09-11 PROCEDURE — 73552 X-RAY EXAM OF FEMUR 2/>: CPT

## 2019-09-11 PROCEDURE — 76377 3D RENDER W/INTRP POSTPROCES: CPT

## 2019-09-11 PROCEDURE — 81001 URINALYSIS AUTO W/SCOPE: CPT

## 2019-09-11 PROCEDURE — 85027 COMPLETE CBC AUTOMATED: CPT

## 2019-09-11 PROCEDURE — 86900 BLOOD TYPING SEROLOGIC ABO: CPT

## 2019-09-11 PROCEDURE — 97530 THERAPEUTIC ACTIVITIES: CPT

## 2019-09-11 PROCEDURE — 86850 RBC ANTIBODY SCREEN: CPT

## 2019-09-11 PROCEDURE — 97116 GAIT TRAINING THERAPY: CPT

## 2019-09-11 PROCEDURE — 86901 BLOOD TYPING SEROLOGIC RH(D): CPT

## 2019-09-11 PROCEDURE — 93005 ELECTROCARDIOGRAM TRACING: CPT

## 2019-09-11 RX ORDER — CALCIUM CARBONATE 500(1250)
1 TABLET ORAL
Qty: 0 | Refills: 0 | DISCHARGE
Start: 2019-09-11

## 2019-09-11 RX ADMIN — Medication 120 MILLIGRAM(S): at 06:01

## 2019-09-11 RX ADMIN — Medication 81 MILLIGRAM(S): at 12:04

## 2019-09-11 RX ADMIN — DRONEDARONE 400 MILLIGRAM(S): 400 TABLET, FILM COATED ORAL at 06:02

## 2019-09-11 RX ADMIN — Medication 1 MILLIGRAM(S): at 12:04

## 2019-09-11 RX ADMIN — Medication 1 TABLET(S): at 12:04

## 2019-09-11 NOTE — PROGRESS NOTE ADULT - ASSESSMENT
86 y/o FM with   impacted right femoral neck hip fracture for operative fixation today, NPO, no narcs  Veronika Gonzalez PA-C  Orthopaedic Surgery  Team pager [phone]  [facility] [phone]  [phone]
A/P: 87F s/p CRPP R Hip POD 1  Analgesia  DVT ppx  WBAT  PT/OT  DC planning
S/P CRPP R Hip    Plan    OOB/PT/WBAT  D/C planning-MELANIE Crabtree PA-C   Beeper    [phone]
She underwent CRPP ORIF on 09/08/19 and is seen post op feeling well and with no medical complications to date.
POD Day #3  87 year old female with a history of HTN, HLD, and bleeding d/o presented with a right femoral neck fracture and is status post CRPP Day 3.  Patient is stable.

## 2019-09-11 NOTE — PROGRESS NOTE ADULT - SUBJECTIVE AND OBJECTIVE BOX
This is the first [facility] admission for this 87-year-old female.  She was in her garage in her new apartment building and slipped on the pavement and was able to get up two days ago.  Shortly thereafter, she developed localized pain in her right hip which persisted.  She went to a walk-in urgent care center and was told that her leg was normal because she can still walk.  She persisted and came to the emergency room for evaluation because of right hip pain and x-rays were unremarkable, but CT scan shows a right subcapital femoral neck fracture which is impacted which is what allows her to walk.  The area is not displaced because it is impacted, but it is unstable and surgical stabilization has been advised. She underwent CRPP ORIF on 09/08/19 and is seen post op feeling well and with no medical complications to date.  The patient has moderate pain and otherwise has no other regions of trauma or new medical complaints. Seen today OOB sitting in chair and states that her pain has been well managed. Scheduled to transfer to rehabilitation tomorrow in [place].      MEDICATIONS  (STANDING):  ascorbic acid 500 milliGRAM(s) Oral two times a day  aspirin enteric coated 81 milliGRAM(s) Oral daily  atorvastatin 20 milliGRAM(s) Oral at bedtime  docusate sodium 100 milliGRAM(s) Oral three times a day  dronedarone 400 milliGRAM(s) Oral two times a day  enoxaparin Injectable 40 milliGRAM(s) SubCutaneous every 24 hours  ferrous    sulfate 325 milliGRAM(s) Oral three times a day with meals  folic acid 1 milliGRAM(s) Oral daily  lactated ringers. 1000 milliLiter(s) (75 mL/Hr) IV Continuous <Continuous>  melatonin 3 milliGRAM(s) Oral at bedtime  multivitamin 1 Tablet(s) Oral daily  verapamil 120 milliGRAM(s) Oral two times a day    MEDICATIONS  (PRN):  acetaminophen   Tablet .. 650 milliGRAM(s) Oral every 6 hours PRN Temp greater or equal to 38C (100.4F), Mild Pain (1 - 3)  benzocaine 15 mG/menthol 3.6 mG Lozenge 1 Lozenge Oral every 6 hours PRN Sore Throat  diphenhydrAMINE 25 milliGRAM(s) Oral at bedtime PRN Insomnia  oxyCODONE    IR 2.5 milliGRAM(s) Oral every 4 hours PRN Moderate Pain (4 - 6)  oxyCODONE    IR 5 milliGRAM(s) Oral every 4 hours PRN Severe Pain (7 - 10)          Vital Signs Last 24 Hrs  T(C): 36.6 (10 Sep 2019 08:01), Max: 36.8 (09 Sep 2019 23:58)  T(F): 97.9 (10 Sep 2019 08:01), Max: 98.3 (09 Sep 2019 23:58)  HR: 75 (10 Sep 2019 08:01) (73 - 90)  BP: 105/58 (10 Sep 2019 08:01) (95/50 - 135/68)  BP(mean): --  RR: 18 (10 Sep 2019 08:01) (18 - 18)  SpO2: 93% (10 Sep 2019 08:01) (93% - 95%)        PHYSICAL EXAM:  GENERAL: NAD, well nourished and conversant  HEAD:  Atraumatic  EYES: EOM, PERRLA, conjunctiva pink and sclera white  ENT: No tonsillar erythema, exudates, or enlargement, moist mucous membranes, good dentition, no lesions  NECK: Supple, No JVD, normal thyroid, carotids with normal upstrokes and no bruits  CHEST/LUNG: Clear to auscultation bilaterally, No rales, rhonchi, wheezing, or rubs  HEART: Regular rate and rhythm, No murmurs, rubs, or gallops  ABDOMEN: Soft, nondistended, no masses, guarding, tenderness or rebound, bowel sounds present  EXTREMITIES:  decreased ROM of right leg  LYMPH: No lymphadenopathy noted  SKIN: No rashes or lesions  NERVOUS SYSTEM:  Alert & Oriented X3, normal cognitive function. Motor Strength 5/5 right upper and right lower.  5/5 left upper and left lower extremities, DTRs 2+ intact and symmetric                  CBC Full  -  ( 09 Sep 2019 10:12 )  WBC Count : 5.71 K/uL  RBC Count : 3.36 M/uL  Hemoglobin : 9.8 g/dL  Hematocrit : 29.7 %  Platelet Count - Automated : 272 K/uL  Mean Cell Volume : 88.4 fl  Mean Cell Hemoglobin : 29.2 pg  Mean Cell Hemoglobin Concentration : 33.0 gm/dL  Auto Neutrophil # : 4.61 K/uL  Auto Lymphocyte # : 0.62 K/uL  Auto Monocyte # : 0.35 K/uL  Auto Eosinophil # : 0.10 K/uL  Auto Basophil # : 0.01 K/uL  Auto Neutrophil % : 80.6 %  Auto Lymphocyte % : 10.9 %  Auto Monocyte % : 6.1 %  Auto Eosinophil % : 1.8 %  Auto Basophil % : 0.2 %    09-10    136  |  99  |  20  ----------------------------<  87  3.6   |  28  |  0.70    Ca    9.1      10 Sep 2019 07:12        PT/INR - ( 08 Sep 2019 17:30 )   PT: 13.5 sec;   INR: 1.17 ratio         PTT - ( 08 Sep 2019 17:30 )  PTT:33.5 sec    CAPILLARY BLOOD GLUCOSE          RADIOLOGY & ADDITIONAL TESTS:

## 2019-09-11 NOTE — PROGRESS NOTE ADULT - PROBLEM SELECTOR PLAN 1
Continue ice q4h  Pain management  DVT ppx- Asprin 81mg QD and Lovenox 40mg q24h  Weight bearing as tolerated  Continue to monitor    MEÑO BacaS  Orthopedic beeper [id number] ice q4h PRN  IS  Pain management  DVT ppx- Asprin 81mg QD and Lovenox 40mg q24h  Weight bearing as tolerated RLE with PT  Continue current treatment  Rehab today    LORENZO Baca  Orthopedic beeper [phone]    I have reviewed the student's note, changed where appropriate.  I agree with the above note.     SHANE Barker PA-C  #[phone]

## 2019-09-11 NOTE — DISCHARGE NOTE NURSING/CASE MANAGEMENT/SOCIAL WORK - NSDCPEWEB_GEN_ALL_CORE
[facility] for Tobacco Control website --- [email or web address] website --- [email or web address]

## 2019-09-11 NOTE — PROGRESS NOTE ADULT - SUBJECTIVE AND OBJECTIVE BOX
Patient is resting comfortably in bed, no acute distress.  She continues to report ongoing right hip pain.    Physical Exam:  General: AXO x3, NAD  Extremities:  RLE- Aquacel dressing C/D/I.  Grossly moving extremity hip flexion and extension.  Compartments are soft and compressible.  Calves are soft and non-tender bilaterally  Sensation intact to light touch.    PF DF EHL FHL intact 5/5  DP present Patient is resting comfortably in bed, no acute distress.  She continues to report ongoing right hip pain.    ICU Vital Signs Last 24 Hrs  T(C): 36.5 (11 Sep 2019 06:01), Max: 36.8 (10 Sep 2019 19:42)  T(F): 97.7 (11 Sep 2019 06:01), Max: 98.2 (10 Sep 2019 19:42)  HR: 72 (11 Sep 2019 06:01) (72 - 92)  BP: 132/64 (11 Sep 2019 06:01) (101/50 - 132/64)  BP(mean): --  ABP: --  ABP(mean): --  RR: 18 (11 Sep 2019 06:01) (18 - 18)  SpO2: 94% (11 Sep 2019 06:01) (93% - 94%)      Physical Exam:  General: AXO x3, NAD  Extremities:  RLE- Aquacel dressing C/D/I.  Grossly moving extremity hip flexion and extension.  Compartments are soft and compressible.  Calves are soft and non-tender bilaterally  Sensation intact to light touch.    PF DF EHL FHL intact 5/5  DP present

## 2019-09-11 NOTE — PROGRESS NOTE ADULT - REASON FOR ADMISSION
right femoral neck fracture

## 2019-09-11 NOTE — PROGRESS NOTE ADULT - PROVIDER SPECIALTY LIST ADULT
Internal Medicine
Orthopedics

## 2019-09-11 NOTE — DISCHARGE NOTE NURSING/CASE MANAGEMENT/SOCIAL WORK - NSDCPEEMAIL_GEN_ALL_CORE
[facility] for Tobacco Control email [email or web address]

## 2019-09-11 NOTE — PROGRESS NOTE ADULT - PROBLEM SELECTOR PROBLEM 1
Closed nondisplaced basicervical fracture of right femur, initial encounter

## 2019-09-11 NOTE — PROGRESS NOTE ADULT - ATTENDING COMMENTS
Patient dced to rehab  continue current meds  PO as tolerated   follow up with ortho  Patient aware of plan

## 2019-09-11 NOTE — DISCHARGE NOTE NURSING/CASE MANAGEMENT/SOCIAL WORK - PATIENT PORTAL LINK FT
You can access the FollowMyHealth Patient Portal offered by [facility] by registering at the following website: [email or web address]. By joining [facility]’s FollowMyHealth portal, you will also be able to view your health information using other applications (apps) compatible with our system.

## 2019-09-28 PROBLEM — I10 ESSENTIAL (PRIMARY) HYPERTENSION: Chronic | Status: ACTIVE | Noted: 2019-09-07

## 2019-09-28 PROBLEM — E78.5 HYPERLIPIDEMIA, UNSPECIFIED: Chronic | Status: ACTIVE | Noted: 2019-09-07

## 2019-09-30 PROBLEM — Z00.00 ENCOUNTER FOR PREVENTIVE HEALTH EXAMINATION: Status: ACTIVE | Noted: 2019-09-30

## 2019-10-08 ENCOUNTER — APPOINTMENT (OUTPATIENT)
Dept: ORTHOPEDIC SURGERY | Facility: CLINIC | Age: 84
End: 2019-10-08
Payer: MEDICARE

## 2019-10-08 PROCEDURE — 72170 X-RAY EXAM OF PELVIS: CPT | Mod: RT

## 2019-10-08 PROCEDURE — 99024 POSTOP FOLLOW-UP VISIT: CPT

## 2020-06-10 ENCOUNTER — TRANSCRIPTION ENCOUNTER (OUTPATIENT)
Age: 85
End: 2020-06-10

## 2020-06-17 ENCOUNTER — APPOINTMENT (OUTPATIENT)
Dept: ORTHOPEDIC SURGERY | Facility: CLINIC | Age: 85
End: 2020-06-17
Payer: MEDICARE

## 2020-06-17 VITALS — TEMPERATURE: 97.2 F

## 2020-06-17 DIAGNOSIS — M25.541 PAIN IN JOINTS OF RIGHT HAND: ICD-10-CM

## 2020-06-17 PROCEDURE — 73130 X-RAY EXAM OF HAND: CPT | Mod: RT

## 2020-06-17 PROCEDURE — 99213 OFFICE O/P EST LOW 20 MIN: CPT

## 2020-07-08 ENCOUNTER — APPOINTMENT (OUTPATIENT)
Dept: ORTHOPEDIC SURGERY | Facility: CLINIC | Age: 85
End: 2020-07-08
Payer: MEDICARE

## 2020-07-08 PROCEDURE — 73130 X-RAY EXAM OF HAND: CPT | Mod: RT

## 2020-07-08 PROCEDURE — 99213 OFFICE O/P EST LOW 20 MIN: CPT

## 2020-09-28 ENCOUNTER — APPOINTMENT (OUTPATIENT)
Dept: GASTROENTEROLOGY | Facility: CLINIC | Age: 85
End: 2020-09-28
Payer: MEDICARE

## 2020-09-28 VITALS
TEMPERATURE: 98 F | BODY MASS INDEX: 22.53 KG/M2 | DIASTOLIC BLOOD PRESSURE: 60 MMHG | HEIGHT: 64 IN | HEART RATE: 94 BPM | OXYGEN SATURATION: 96 % | WEIGHT: 132 LBS | SYSTOLIC BLOOD PRESSURE: 120 MMHG

## 2020-09-28 PROCEDURE — 99203 OFFICE O/P NEW LOW 30 MIN: CPT | Mod: 25

## 2020-09-28 PROCEDURE — 36415 COLL VENOUS BLD VENIPUNCTURE: CPT

## 2020-09-28 RX ORDER — METHYLCELLULOSE 500 MG/1
500 TABLET ORAL DAILY
Qty: 120 | Refills: 3 | Status: ACTIVE | COMMUNITY
Start: 2020-09-28 | End: 1900-01-01

## 2020-09-29 LAB
ANION GAP SERPL CALC-SCNC: 13 MMOL/L
BASOPHILS # BLD AUTO: 0.04 K/UL
BASOPHILS NFR BLD AUTO: 0.7 %
BUN SERPL-MCNC: 20 MG/DL
CALCIUM SERPL-MCNC: 9.4 MG/DL
CHLORIDE SERPL-SCNC: 101 MMOL/L
CO2 SERPL-SCNC: 25 MMOL/L
CREAT SERPL-MCNC: 0.85 MG/DL
EOSINOPHIL # BLD AUTO: 0.07 K/UL
EOSINOPHIL NFR BLD AUTO: 1.2 %
FERRITIN SERPL-MCNC: 87 NG/ML
GLUCOSE SERPL-MCNC: 79 MG/DL
HCT VFR BLD CALC: 40.3 %
HGB BLD-MCNC: 12.6 G/DL
IMM GRANULOCYTES NFR BLD AUTO: 0.2 %
IRON SATN MFR SERPL: 17 %
IRON SERPL-MCNC: 53 UG/DL
LYMPHOCYTES # BLD AUTO: 0.91 K/UL
LYMPHOCYTES NFR BLD AUTO: 15.6 %
MAN DIFF?: NORMAL
MCHC RBC-ENTMCNC: 29.4 PG
MCHC RBC-ENTMCNC: 31.3 GM/DL
MCV RBC AUTO: 93.9 FL
MONOCYTES # BLD AUTO: 0.48 K/UL
MONOCYTES NFR BLD AUTO: 8.2 %
NEUTROPHILS # BLD AUTO: 4.32 K/UL
NEUTROPHILS NFR BLD AUTO: 74.1 %
PLATELET # BLD AUTO: 331 K/UL
POTASSIUM SERPL-SCNC: 4.7 MMOL/L
RBC # BLD: 4.29 M/UL
RBC # FLD: 12.9 %
SODIUM SERPL-SCNC: 139 MMOL/L
TIBC SERPL-MCNC: 308 UG/DL
UIBC SERPL-MCNC: 255 UG/DL
WBC # FLD AUTO: 5.83 K/UL

## 2020-12-11 ENCOUNTER — APPOINTMENT (OUTPATIENT)
Dept: GASTROENTEROLOGY | Facility: CLINIC | Age: 85
End: 2020-12-11
Payer: MEDICARE

## 2020-12-11 VITALS
TEMPERATURE: 97.8 F | BODY MASS INDEX: 22.53 KG/M2 | DIASTOLIC BLOOD PRESSURE: 80 MMHG | WEIGHT: 132 LBS | HEART RATE: 101 BPM | HEIGHT: 64 IN | SYSTOLIC BLOOD PRESSURE: 130 MMHG | OXYGEN SATURATION: 97 %

## 2020-12-11 DIAGNOSIS — K60.2 ANAL FISSURE, UNSPECIFIED: ICD-10-CM

## 2020-12-11 PROCEDURE — 99213 OFFICE O/P EST LOW 20 MIN: CPT

## 2020-12-11 PROCEDURE — 99072 ADDL SUPL MATRL&STAF TM PHE: CPT

## 2020-12-14 ENCOUNTER — NON-APPOINTMENT (OUTPATIENT)
Age: 85
End: 2020-12-14

## 2020-12-15 ENCOUNTER — NON-APPOINTMENT (OUTPATIENT)
Age: 85
End: 2020-12-15

## 2021-01-18 ENCOUNTER — NON-APPOINTMENT (OUTPATIENT)
Age: 86
End: 2021-01-18

## 2021-01-20 ENCOUNTER — APPOINTMENT (OUTPATIENT)
Dept: COLORECTAL SURGERY | Facility: CLINIC | Age: 86
End: 2021-01-20
Payer: MEDICARE

## 2021-01-20 VITALS
BODY MASS INDEX: 21.66 KG/M2 | DIASTOLIC BLOOD PRESSURE: 70 MMHG | TEMPERATURE: 98.4 F | RESPIRATION RATE: 16 BRPM | HEIGHT: 65 IN | SYSTOLIC BLOOD PRESSURE: 113 MMHG | WEIGHT: 130 LBS | HEART RATE: 86 BPM | OXYGEN SATURATION: 95 %

## 2021-01-20 DIAGNOSIS — Z82.49 FAMILY HISTORY OF ISCHEMIC HEART DISEASE AND OTHER DISEASES OF THE CIRCULATORY SYSTEM: ICD-10-CM

## 2021-01-20 DIAGNOSIS — Z86.79 PERSONAL HISTORY OF OTHER DISEASES OF THE CIRCULATORY SYSTEM: ICD-10-CM

## 2021-01-20 DIAGNOSIS — Z78.9 OTHER SPECIFIED HEALTH STATUS: ICD-10-CM

## 2021-01-20 DIAGNOSIS — Z86.39 PERSONAL HISTORY OF OTHER ENDOCRINE, NUTRITIONAL AND METABOLIC DISEASE: ICD-10-CM

## 2021-01-20 DIAGNOSIS — K64.8 OTHER HEMORRHOIDS: ICD-10-CM

## 2021-01-20 PROCEDURE — 99072 ADDL SUPL MATRL&STAF TM PHE: CPT

## 2021-01-20 PROCEDURE — 45300 PROCTOSIGMOIDOSCOPY DX: CPT

## 2021-01-20 PROCEDURE — 99204 OFFICE O/P NEW MOD 45 MIN: CPT | Mod: 25

## 2021-01-20 RX ORDER — VERAPAMIL HYDROCHLORIDE 80 MG/1
TABLET ORAL
Refills: 0 | Status: ACTIVE | COMMUNITY

## 2021-01-20 RX ORDER — ASPIRIN 81 MG
81 TABLET, DELAYED RELEASE (ENTERIC COATED) ORAL
Refills: 0 | Status: ACTIVE | COMMUNITY

## 2021-01-20 RX ORDER — HYDROCORTISONE 2.5% 25 MG/G
2.5 CREAM TOPICAL TWICE DAILY
Qty: 1 | Refills: 3 | Status: ACTIVE | COMMUNITY
Start: 2021-01-20 | End: 1900-01-01

## 2021-01-20 RX ORDER — FUROSEMIDE 80 MG/1
TABLET ORAL
Refills: 0 | Status: ACTIVE | COMMUNITY

## 2021-01-20 RX ORDER — ATORVASTATIN CALCIUM 80 MG/1
TABLET, FILM COATED ORAL
Refills: 0 | Status: ACTIVE | COMMUNITY

## 2021-01-20 NOTE — PHYSICAL EXAM
[Normal Breath Sounds] : Normal breath sounds [Normal Heart Sounds] : normal heart sounds [Normal Rate and Rhythm] : normal rate and rhythm [Murmur] : murmur was appreciated [Alert] : alert [Oriented to Person] : oriented to person [Oriented to Place] : oriented to place [Oriented to Time] : oriented to time [Calm] : calm [Excoriation] : no perianal excoriation [Fistula] : no fistulas [Wart] : no warts [Ulcer ___ cm] : no ulcers [Normal] : was normal [None] : there was no rectal mass  [de-identified] : soft, +BS, NT/ND [de-identified] : no sig external hemorrhoids [de-identified] : rigid sig done to ~ 12 cm and no lesions seen, + hard stool in the rectal vault somewhat obscuring view, + enlarged internal hemorrhoids [de-identified] : well nourished [de-identified] : NC/AT [de-identified] : +ROM/NICOLE [de-identified] : intact

## 2021-01-20 NOTE — HISTORY OF PRESENT ILLNESS
[FreeTextEntry1] : Patient is a 89 yo female, here with c/o rectal bleeding. She has had rectal bleeding for the last few months. This is accompanied by discomfort but no pain. She has had changes to her bowel habits, now having multiple BM per day with small hard stools. Last colonoscopy was 10+ years ago in [place]. Patient is on ASA 81mg for AVR. \par She is convinced that she has a colon cancer that is the cause of the bleeding and has made up her mind that if it is a cancer that it's going to kill her. She is very hesitant to proceed with any tests for fear of finding a cancer.

## 2021-01-26 ENCOUNTER — NON-APPOINTMENT (OUTPATIENT)
Age: 86
End: 2021-01-26

## 2021-01-27 RX ORDER — PEG-3350, SODIUM SULFATE, SODIUM CHLORIDE, POTASSIUM CHLORIDE, SODIUM ASCORBATE AND ASCORBIC ACID 7.5-2.691G
100 KIT ORAL
Qty: 1 | Refills: 0 | Status: ACTIVE | COMMUNITY
Start: 2021-01-27 | End: 1900-01-01

## 2021-01-29 RX ORDER — SODIUM PICOSULFATE, MAGNESIUM OXIDE, AND ANHYDROUS CITRIC ACID 10; 3.5; 12 MG/160ML; G/160ML; G/160ML
10-3.5-12 MG-GM LIQUID ORAL
Qty: 1 | Refills: 0 | Status: ACTIVE | COMMUNITY
Start: 2021-01-29 | End: 1900-01-01

## 2021-02-02 ENCOUNTER — NON-APPOINTMENT (OUTPATIENT)
Age: 86
End: 2021-02-02

## 2021-02-03 ENCOUNTER — NON-APPOINTMENT (OUTPATIENT)
Age: 86
End: 2021-02-03

## 2021-02-08 ENCOUNTER — NON-APPOINTMENT (OUTPATIENT)
Age: 86
End: 2021-02-08

## 2021-02-12 DIAGNOSIS — K59.09 OTHER CONSTIPATION: ICD-10-CM

## 2021-02-12 DIAGNOSIS — K62.5 HEMORRHAGE OF ANUS AND RECTUM: ICD-10-CM

## 2021-02-12 RX ORDER — SODIUM PICOSULFATE, MAGNESIUM OXIDE, AND ANHYDROUS CITRIC ACID 10; 3.5; 12 MG/160ML; G/160ML; G/160ML
10-3.5-12 MG-GM LIQUID ORAL
Qty: 1 | Refills: 0 | Status: ACTIVE | COMMUNITY
Start: 2021-02-12 | End: 1900-01-01

## 2021-02-13 DIAGNOSIS — Z01.818 ENCOUNTER FOR OTHER PREPROCEDURAL EXAMINATION: ICD-10-CM

## 2021-02-15 ENCOUNTER — APPOINTMENT (OUTPATIENT)
Dept: DISASTER EMERGENCY | Facility: CLINIC | Age: 86
End: 2021-02-15

## 2021-02-18 ENCOUNTER — APPOINTMENT (OUTPATIENT)
Dept: GASTROENTEROLOGY | Facility: HOSPITAL | Age: 86
End: 2021-02-18

## 2021-02-25 ENCOUNTER — APPOINTMENT (OUTPATIENT)
Dept: COLORECTAL SURGERY | Facility: CLINIC | Age: 86
End: 2021-02-25

## 2021-04-05 ENCOUNTER — APPOINTMENT (OUTPATIENT)
Dept: ORTHOPEDIC SURGERY | Facility: CLINIC | Age: 86
End: 2021-04-05

## 2021-05-13 ENCOUNTER — APPOINTMENT (OUTPATIENT)
Dept: ORTHOPEDIC SURGERY | Facility: CLINIC | Age: 86
End: 2021-05-13

## 2021-12-08 NOTE — ED ADULT NURSE NOTE - PAIN RATING/NUMBER SCALE (0-10): ACTIVITY
5 The patient presents for colonoscopy evaluation.  H/o chronic constipation for years. can have 1-2 bm's per week which is average for her.  denies abd pain, gi bleeding, change in bowel habits or weight loss.  not on plavix/AC.

## 2022-01-14 NOTE — ED PROVIDER NOTE - PROGRESS NOTE ADDITIONAL2
Additional Progress Note... Otolaryngologist Procedure Text (A): After obtaining clear surgical margins the patient was sent to otolaryngology for surgical repair.  The patient understands they will receive post-surgical care and follow-up from the referring physician's office.

## 2022-02-23 ENCOUNTER — NON-APPOINTMENT (OUTPATIENT)
Age: 87
End: 2022-02-23

## 2022-02-23 ENCOUNTER — APPOINTMENT (OUTPATIENT)
Dept: OPHTHALMOLOGY | Facility: CLINIC | Age: 87
End: 2022-02-23
Payer: MEDICARE

## 2022-02-23 PROCEDURE — 92004 COMPRE OPH EXAM NEW PT 1/>: CPT

## 2022-02-23 PROCEDURE — 92250 FUNDUS PHOTOGRAPHY W/I&R: CPT

## 2022-02-28 ENCOUNTER — APPOINTMENT (OUTPATIENT)
Dept: OPHTHALMOLOGY | Facility: CLINIC | Age: 87
End: 2022-02-28
Payer: MEDICARE

## 2022-02-28 ENCOUNTER — NON-APPOINTMENT (OUTPATIENT)
Age: 87
End: 2022-02-28

## 2022-02-28 PROCEDURE — 92083 EXTENDED VISUAL FIELD XM: CPT

## 2022-02-28 PROCEDURE — 99214 OFFICE O/P EST MOD 30 MIN: CPT

## 2022-02-28 PROCEDURE — 92133 CPTRZD OPH DX IMG PST SGM ON: CPT

## 2022-05-13 ENCOUNTER — TRANSCRIPTION ENCOUNTER (OUTPATIENT)
Age: 87
End: 2022-05-13

## 2022-05-17 ENCOUNTER — APPOINTMENT (OUTPATIENT)
Dept: DISASTER EMERGENCY | Facility: HOSPITAL | Age: 87
End: 2022-05-17

## 2022-05-17 ENCOUNTER — OUTPATIENT (OUTPATIENT)
Dept: OUTPATIENT SERVICES | Facility: HOSPITAL | Age: 87
LOS: 1 days | End: 2022-05-17

## 2022-05-17 VITALS
DIASTOLIC BLOOD PRESSURE: 73 MMHG | SYSTOLIC BLOOD PRESSURE: 147 MMHG | TEMPERATURE: 98 F | WEIGHT: 136.91 LBS | RESPIRATION RATE: 20 BRPM | HEIGHT: 63 IN | HEART RATE: 79 BPM | OXYGEN SATURATION: 96 %

## 2022-05-17 VITALS
DIASTOLIC BLOOD PRESSURE: 80 MMHG | RESPIRATION RATE: 20 BRPM | SYSTOLIC BLOOD PRESSURE: 132 MMHG | HEART RATE: 76 BPM | OXYGEN SATURATION: 97 % | TEMPERATURE: 98 F

## 2022-05-17 DIAGNOSIS — Z95.2 PRESENCE OF PROSTHETIC HEART VALVE: Chronic | ICD-10-CM

## 2022-05-17 DIAGNOSIS — U07.1 COVID-19: ICD-10-CM

## 2022-05-17 RX ORDER — BEBTELOVIMAB 87.5 MG/ML
175 INJECTION, SOLUTION INTRAVENOUS ONCE
Refills: 0 | Status: COMPLETED | OUTPATIENT
Start: 2022-05-17 | End: 2022-05-17

## 2022-05-17 RX ADMIN — BEBTELOVIMAB 175 MILLIGRAM(S): 87.5 INJECTION, SOLUTION INTRAVENOUS at 11:44

## 2022-05-17 NOTE — MONOCLONAL ANTIBODY INFUSION - EXAM
CC: Monoclonal Antibody Infusion/COVID 19 Positive  89yFemale    exam/findings:  T(C): 36.7 (05-17-22 @ 11:33), Max: 36.7 (05-17-22 @ 11:33)  HR: 79 (05-17-22 @ 11:33) (79 - 79)  BP: 147/73 (05-17-22 @ 11:33) (147/73 - 147/73)  RR: 20 (05-17-22 @ 11:33) (20 - 20)  SpO2: 96% (05-17-22 @ 11:33) (96% - 96%)      PE:   Appearance: NAD	  HEENT:   Normal oral mucosa,   Lymphatic: No lymphadenopathy  Cardiovascular: Normal S1 S2, No JVD, No murmurs, No edema  Respiratory: Lungs clear to auscultation	  Gastrointestinal:  Soft, Non-tender, + BS	  Skin: warm and dry  Neurologic: Non-focal  Extremities: Normal range of motion

## 2022-05-17 NOTE — MONOCLONAL ANTIBODY INFUSION - ASSESSMENT AND PLAN
ASSESSMENT:  Pt is an 88 y/o F who tested Covid + 4 days ago  referred by Dr. 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

## 2022-05-17 NOTE — MONOCLONAL ANTIBODY INFUSION - HOME MEDICATIONS
calcium carbonate 500 mg (200 mg elemental calcium) oral tablet, chewable , 1 tab(s) orally 3 times a day, As needed, Indigestion  enoxaparin , 40 milligram(s) subcutaneous once a day x 6 weeks total for dvt prevention  oxyCODONE , 2.5 milligram(s) orally every 4 hours, As Needed for moderate pain  oxyCODONE 5 mg oral tablet , 1 tab(s) orally every 4 hours, As needed, Severe Pain (7 - 10)  docusate sodium 100 mg oral capsule , 1 cap(s) orally 3 times a day  Multiple Vitamins oral tablet , 1 tab(s) orally once a day  folic acid 1 mg oral tablet , 1 tab(s) orally once a day  ascorbic acid 500 mg oral tablet , 1 tab(s) orally 2 times a day  acetaminophen 325 mg oral tablet , 2 tab(s) orally every 6 hours, As needed, Temp greater or equal to 38C (100.4F), or mild pain  potassium chloride 10 mEq oral capsule, extended release , 1 cap(s) orally once a day  atorvastatin 20 mg oral tablet , 1 tab(s) orally once a day  Ecotrin Adult Low Strength 81 mg oral delayed release tablet , 1 tab(s) orally once a day  furosemide 20 mg oral tablet , 1 tab(s) orally once a day  verapamil 300 mg/24 hours oral capsule, extended release , 1 cap(s) orally once a day (at bedtime)  Multaq 400 mg oral tablet , 1 tab(s) orally 2 times a day

## 2022-08-02 ENCOUNTER — APPOINTMENT (OUTPATIENT)
Dept: ORTHOPEDIC SURGERY | Facility: CLINIC | Age: 87
End: 2022-08-02

## 2022-08-02 DIAGNOSIS — S62.366A NONDISPLACED FRACTURE OF NECK OF FIFTH METACARPAL BONE, RIGHT HAND, INITIAL ENCOUNTER FOR CLOSED FRACTURE: ICD-10-CM

## 2022-08-02 PROCEDURE — 73502 X-RAY EXAM HIP UNI 2-3 VIEWS: CPT

## 2022-08-02 PROCEDURE — 99213 OFFICE O/P EST LOW 20 MIN: CPT

## 2022-08-05 PROBLEM — S62.366A CLOSED NONDISPLACED FRACTURE OF NECK OF FIFTH METACARPAL BONE OF RIGHT HAND, INITIAL ENCOUNTER: Status: ACTIVE | Noted: 2020-06-19

## 2022-08-08 ENCOUNTER — APPOINTMENT (OUTPATIENT)
Dept: ORTHOPEDIC SURGERY | Facility: CLINIC | Age: 87
End: 2022-08-08

## 2022-08-08 DIAGNOSIS — S72.001D FRACTURE OF UNSPECIFIED PART OF NECK OF RIGHT FEMUR, SUBSEQUENT ENCOUNTER FOR CLOSED FRACTURE WITH ROUTINE HEALING: ICD-10-CM

## 2022-08-08 PROCEDURE — 99213 OFFICE O/P EST LOW 20 MIN: CPT | Mod: 25

## 2022-08-08 PROCEDURE — 20610 DRAIN/INJ JOINT/BURSA W/O US: CPT | Mod: RT

## 2022-08-09 NOTE — PROCEDURE
[Injection] : Injection [Right] : of the right [Greater Trochanter] : greater trochanteric bursa [Inflammation] : Inflammation [Joint Pain] : Joint pain [Patient] : patient [Risk] : risk [Benefits] : benefits [Alternatives] : alternatives [Bleeding] : bleeding [Infection] : infection [Allergic Reaction] : allergic reaction [Verbal Consent Obtained] : verbal consent was obtained prior to the procedure [Alcohol] : Alcohol [Betadine] : Betadine [Ethyl Chloride Spray] : ethyl chloride spray was used as a topical anesthetic [Direct] : direct [20] : a 20-gauge [1% Lidocaine___(mL)] : [unfilled] mL of 1% Lidocaine [Methylpred. 40mg/mL___(mL)] : [unfilled] mL 40mg/mL methylprednisolone [Bandage Applied] : a bandage [Tolerated Well] : The patient tolerated the procedure well [None] : none [PRN] : as needed

## 2022-08-12 PROBLEM — S72.001D CLOSED FRACTURE OF NECK OF RIGHT FEMUR WITH ROUTINE HEALING, SUBSEQUENT ENCOUNTER: Status: ACTIVE | Noted: 2019-10-07

## 2022-08-12 NOTE — HISTORY OF PRESENT ILLNESS
[de-identified] :  S/p CRPP Rt Femoral neck fracture 9/8/19  [de-identified] :  S/p CRPP Rt Femoral neck fracture 9/8/19. She is  ambulant with a walker.  She is having some right sided mild intermittent lateral hip/ leg pain. She was seen 8/2/22 and Dx with trochanteric bursitis.She returns today with continued complaints lateral sided pain/ She wishes to undergo an injection to her GT.  [de-identified] : Physical exam of the right hip \par Surgical incision CDI healed no erythema no drainage. \par SILT L1-S1 TN SPN DPN SN \par mild tenderness over the trochanteric bursa\par Hip ROM 0-100 degrees flexion 20 degrees IR 20 degree ER 30 degrees abduction \par She has +4/5 strength quads hamstrings abductors. \par NVID distally\par walks with Trendelenburg gait  [de-identified] : Ap and lateral RT hip taken today and reviewed by me show healed  RT impacted femoral neck fracture with screws in good position no signs of mechanical failure  [de-identified] :  S/p CRPP Rt Femoral neck fracture 9/8/19 doing well no acute fractures - trochanteric bursitis  [de-identified] :  S/p CRPP Rt Femoral neck fracture 9/8/19 \par P: \par - Continue WBAT and ROMAT \par - Prescription for outpatient PT given \par injection to R greater trochanteric bursa performed in the office under sterile conditions.\par - F/U PRN

## 2022-11-03 NOTE — PROGRESS NOTE ADULT - NSHPATTENDINGPLANDISCUSS_GEN_ALL_CORE
Prescription approved per [facility] Refill Protocol.  Villa West RN  [facility] Triage Nurse  
the patient and the orthopedic resident.

## 2022-11-16 ENCOUNTER — APPOINTMENT (OUTPATIENT)
Dept: OPHTHALMOLOGY | Facility: CLINIC | Age: 87
End: 2022-11-16

## 2022-12-31 ENCOUNTER — NON-APPOINTMENT (OUTPATIENT)
Age: 87
End: 2022-12-31

## 2023-01-04 ENCOUNTER — APPOINTMENT (OUTPATIENT)
Dept: PODIATRY | Facility: CLINIC | Age: 88
End: 2023-01-04
Payer: MEDICARE

## 2023-01-04 DIAGNOSIS — M19.079 PRIMARY OSTEOARTHRITIS, UNSPECIFIED ANKLE AND FOOT: ICD-10-CM

## 2023-01-04 PROCEDURE — 99203 OFFICE O/P NEW LOW 30 MIN: CPT

## 2023-01-04 PROCEDURE — 73630 X-RAY EXAM OF FOOT: CPT | Mod: RT

## 2023-01-04 RX ORDER — MELOXICAM 15 MG/1
15 TABLET ORAL DAILY
Qty: 14 | Refills: 1 | Status: ACTIVE | COMMUNITY
Start: 2023-01-04 | End: 1900-01-01

## 2023-01-10 ENCOUNTER — APPOINTMENT (OUTPATIENT)
Dept: PODIATRY | Facility: CLINIC | Age: 88
End: 2023-01-10
Payer: MEDICARE

## 2023-01-10 DIAGNOSIS — T69.1XXD CHILBLAINS, SUBSEQUENT ENCOUNTER: ICD-10-CM

## 2023-01-10 PROCEDURE — 99213 OFFICE O/P EST LOW 20 MIN: CPT

## 2023-01-24 NOTE — PHYSICAL EXAM
[General Appearance - Alert] : alert [2+] : left foot dorsalis pedis 2+ [Sensation] : the sensory exam was normal to light touch and pinprick [No Focal Deficits] : no focal deficits [Deep Tendon Reflexes (DTR)] : deep tendon reflexes were 2+ and symmetric [Motor Exam] : the motor exam was normal [Delayed in the Right Toes] : capillary refills normal in right toes [Delayed in the Left Toes] : capillary refills normal in the left toes [FreeTextEntry3] : Temperature gradient: warm to cool.  [de-identified] : There is pain with attempted motion of the proximal interphalangeal joint of the right 3rd toe.  No increased temperature.  No drainage.   [FreeTextEntry1] : Swelling, right 3rd toe with some local erythema.  No evidence of cellulitis.  No abscess.  No obvious paronychia.

## 2023-01-24 NOTE — ASSESSMENT
[Verbal] : verbal [Patient] : patient [Good - alert, interested, motivated] : Good - alert, interested, motivated [Signs and symptoms of infection] : sign and symptoms of infection [FreeTextEntry1] : Impression: I do not feel that it is an infection.  It may be some type of rheumatologic disorder.  She was advised to see her internist about this.  It may be arthritis of the joint (M19.07).\par \par Treatment: She was placed on oral anti-inflammatory.  She was given a prescription for Mobic to see if that resolves the problem.  She is to continue taking the Doxycycline as a precaution.  Will reevaluate on Tuesday.  She was advised to call the office immediately if there is any change in color or proximal spread of the erythema.  Any questions or problems, patient is to contact the office. \par

## 2023-01-24 NOTE — ASSESSMENT
[FreeTextEntry1] : Impression: Chilblains (T69.1XXD)\par \par Treatment: Patient is to continue to use the  Coban and keep the toe warm.  If it worsens, she is to call the office.  Any questions or problems, patient is to contact the office.

## 2023-01-24 NOTE — CONSULT LETTER
[Dear  ___] : Dear  [unfilled], [Consult Letter:] : I had the pleasure of evaluating your patient, [unfilled]. [Please see my note below.] : Please see my note below. [Consult Closing:] : Thank you very much for allowing me to participate in the care of this patient.  If you have any questions, please do not hesitate to contact me. [Sincerely,] : Sincerely, [FreeTextEntry2] : Zak Dillon MD\par [address]\[place] [address] [FreeTextEntry3] : Charles Lombardi, DPM

## 2023-01-24 NOTE — HISTORY OF PRESENT ILLNESS
[Sneakers] : lesvia [FreeTextEntry1] : 89 year old female present today with pain in the right 3rd toe with swelling.  She was seen at Urgent Care 5 days ago and was given Doxycycline.  She has noticed no change in the redness and swelling.  She has pain at the proximal interphalangeal joint.  She is unsure if she banged the toe.  \par

## 2023-01-24 NOTE — HISTORY OF PRESENT ILLNESS
[Sneakers] : lesvia [FreeTextEntry1] : Patient presents today for reevaluation of right 3rd toe.  There is still no improvement.  She has found that the Coban seems to help.  There is no worsening of the erythema.  Does not appear to be an infection.  Patient refuses to accept that this could possibly be chilblains.  Since this occurred 1.5 to 2 weeks ago with the cold weather, this is certainly a possibility.  No new changes to her medical status.

## 2023-01-24 NOTE — PHYSICAL EXAM
[General Appearance - Alert] : alert [2+] : left foot dorsalis pedis 2+ [Sensation] : the sensory exam was normal to light touch and pinprick [No Focal Deficits] : no focal deficits [Deep Tendon Reflexes (DTR)] : deep tendon reflexes were 2+ and symmetric [Motor Exam] : the motor exam was normal [Oriented To Time, Place, And Person] : oriented to person, place, and time [Delayed in the Right Toes] : capillary refills normal in right toes [Delayed in the Left Toes] : capillary refills normal in the left toes [FreeTextEntry3] : Temperature gradient: warm to cool.  [de-identified] : No pain about the nail.  There is pain with attempted motion of the proximal interphalangeal joint of the right 3rd toe.  No increased temperature.  No drainage.   [FreeTextEntry1] : Swelling, right 3rd toe with some local erythema.  No evidence of cellulitis.  No abscess.  No obvious paronychia.

## 2023-01-24 NOTE — PROCEDURE
[FreeTextEntry1] : X-rays were taken to rule out for fracture of the digit. \par (DP, medial oblique, lateral - right foot) No evidence of any fracture of the digit but there is arthritis at the proximal interphalangeal joint of the toe.  There may have been an old fracture of the 4th toe but there is no pain in that area.

## 2023-05-01 ENCOUNTER — APPOINTMENT (OUTPATIENT)
Dept: OPHTHALMOLOGY | Facility: CLINIC | Age: 88
End: 2023-05-01

## 2023-05-25 ENCOUNTER — NON-APPOINTMENT (OUTPATIENT)
Age: 88
End: 2023-05-25

## 2023-06-29 ENCOUNTER — APPOINTMENT (OUTPATIENT)
Dept: ORTHOPEDIC SURGERY | Facility: CLINIC | Age: 88
End: 2023-06-29

## 2023-07-17 ENCOUNTER — APPOINTMENT (OUTPATIENT)
Dept: ORTHOPEDIC SURGERY | Facility: CLINIC | Age: 88
End: 2023-07-17

## 2024-12-23 NOTE — ED PROVIDER NOTE - DISPOSITION TYPE
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  Lapse in care due to illness.  Progress is limited at this time due to inability to attend physical therapy appointments or perform HEP.  She reports having more bad days than good for pain.  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, inability to drive to appointments, difficulty negotiating stairs    Pain / Symptoms  - Pain rating (out of 10): Current: 5       OBJECTIVE                                                                                                                    Posture:  - Seated: poor                       Outcome/Assessments  Patient Specific Functional Scale:   Activity: Standing for > 10 minutes, Score: 5  Activity: Negotiate stairs with tolerable pain, Score: 5  Activity: Clean the house such as standing to make dinner, bend over to load laundry or mop the floor, Score: 4  Average Score: 4.7  Each activity is scored: 0=unable to perform activity to 10=able to perform activity at the same level as before injury or problem    Treatment     Therapeutic Exercise  Scapular retraction yellow x 8  Shoulder pull downs yellow x 8  Corner push up x 6  Educated on importance of walking or bicycling     Neuromuscular Re-Education  The patient was instructed in sitting posture.  The patient was cued for the following principles:  1)  Feet flat on floor.  If unable to reach the floor, the patient was educated to put a block/step under the feet or place a support behind the lumbar spine to allow them to sit closer to the edge of the table.  2)   Neutral lumbar spine  3) Sitting on pelvic floor  4) Correction of thoracic position and good sternum position   5) Setting shoulders  6)  Head and neck position.  Visual, tactile and verbal cues used for instruction    Educated patient in the importance of graded exercise and activity within functional pain. Educated patient in the importance of space, movement and blood is needed to keep nerves and the nervous system healthy.  Discussed current barriers to HEP.              ASSESSMENT                                                                                                            Gains in skilled therapy to date not as expected due to lapse in care due to illness.  Patient challenged with recommended therapy attendance due to illness.  Patient challenged with recommended HEP due to illness.  Progress toward discharge/long term goals (see below for specific status updates): no progress due to decreased attendance  Medically necessary skilled therapy interventions continue to be required to allow the patient to maximize their functional abilities as noted in goal status.  Pain/symptoms after session (out of 10): 5  Education:   - Results of above outlined education: Verbalizes understanding and Demonstrates understanding    PLAN                                                                                                                          Continue interventions, frequency and duration established at initial evaluation.    Suggestions for next session as indicated: Progress HEP for strengthening and neutral sitting and standing postures.    Goals  Long Term Goals: to be met by end of plan of care  1. 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  Status: not met No progress as patient has not had regular attendance or HEP  2. Patient will report tolerating  sitting for 30 minutes with patient reported manageable pain/symptoms of 4/10 for computer/desk work. Status: not metNo progress as patient has not had regular attendance or HEP  3. Patient will report tolerating standing for 30 minutes with patient reported manageable/tolerable difficulty for tasks for independent living such as washing dishes. Patient demonstrates a minimum of 4/5 LE strength in areas tested. Status: not met No progress as patient has not had regular attendance or HEP      Therapy procedure time and total treatment time can be found documented on the Time Entry flowsheet     ADMIT

## 2025-07-30 NOTE — ED ADULT TRIAGE NOTE - AS O2 DELIVERY
Patient called inquiring if post-transplant vaccines may be administered in clinic. Call placed to pre-service, as referral placed 2/4/25; Referral [id number] with no communication notes.     Referral to be looked at by Transplant to determine where vaccines can be administered.    Request for Injectable Pre-Authorization:      DTap-hepatitis B recombinant-IPV (Pediarix)   Haemophilus B conjugate (PedvaxHIB)   Meningococcal Conjugate (Menveo)   Pneumococcal 20-valent (Prevnar 20)   Varicella Zoster [recombinant] (Shingrix)   Measles-mumps-rubella (MMR)  
Patient received a call from the [facility] pharmacy about getting a prior authorization for the transplant vaccines to be administered there.  Please return her call.   
room air